# Patient Record
Sex: MALE | Race: WHITE | Employment: FULL TIME | URBAN - METROPOLITAN AREA
[De-identification: names, ages, dates, MRNs, and addresses within clinical notes are randomized per-mention and may not be internally consistent; named-entity substitution may affect disease eponyms.]

---

## 2017-01-13 ENCOUNTER — HOSPITAL ENCOUNTER (EMERGENCY)
Facility: CLINIC | Age: 37
Discharge: HOME | End: 2017-01-13
Attending: EMERGENCY MEDICINE | Admitting: EMERGENCY MEDICINE

## 2017-01-13 ENCOUNTER — APPOINTMENT (OUTPATIENT)
Dept: CT IMAGING | Facility: CLINIC | Age: 37
End: 2017-01-13
Attending: EMERGENCY MEDICINE

## 2017-01-13 VITALS
RESPIRATION RATE: 12 BRPM | DIASTOLIC BLOOD PRESSURE: 103 MMHG | OXYGEN SATURATION: 98 % | SYSTOLIC BLOOD PRESSURE: 147 MMHG | TEMPERATURE: 98.3 F

## 2017-01-13 DIAGNOSIS — R07.89 ATYPICAL CHEST PAIN: ICD-10-CM

## 2017-01-13 DIAGNOSIS — R42 DIZZINESS: ICD-10-CM

## 2017-01-13 LAB
ALBUMIN SERPL-MCNC: 4.3 G/DL (ref 3.4–5)
ALP SERPL-CCNC: 77 U/L (ref 40–150)
ALT SERPL W P-5'-P-CCNC: 76 U/L (ref 0–70)
ANION GAP SERPL CALCULATED.3IONS-SCNC: 8 MMOL/L (ref 3–14)
AST SERPL W P-5'-P-CCNC: 68 U/L (ref 0–45)
BASOPHILS # BLD AUTO: 0.1 10E9/L (ref 0–0.2)
BASOPHILS NFR BLD AUTO: 0.7 %
BILIRUB SERPL-MCNC: 1.1 MG/DL (ref 0.2–1.3)
BUN SERPL-MCNC: 15 MG/DL (ref 7–30)
CALCIUM SERPL-MCNC: 9 MG/DL (ref 8.5–10.1)
CHLORIDE SERPL-SCNC: 104 MMOL/L (ref 94–109)
CO2 SERPL-SCNC: 27 MMOL/L (ref 20–32)
CREAT SERPL-MCNC: 0.77 MG/DL (ref 0.66–1.25)
DIFFERENTIAL METHOD BLD: NORMAL
EOSINOPHIL # BLD AUTO: 0.4 10E9/L (ref 0–0.7)
EOSINOPHIL NFR BLD AUTO: 4.9 %
ERYTHROCYTE [DISTWIDTH] IN BLOOD BY AUTOMATED COUNT: 12.1 % (ref 10–15)
GFR SERPL CREATININE-BSD FRML MDRD: ABNORMAL ML/MIN/1.7M2
GLUCOSE SERPL-MCNC: 85 MG/DL (ref 70–99)
HCT VFR BLD AUTO: 45.3 % (ref 40–53)
HGB BLD-MCNC: 15.4 G/DL (ref 13.3–17.7)
IMM GRANULOCYTES # BLD: 0 10E9/L (ref 0–0.4)
IMM GRANULOCYTES NFR BLD: 0.3 %
LYMPHOCYTES # BLD AUTO: 2.7 10E9/L (ref 0.8–5.3)
LYMPHOCYTES NFR BLD AUTO: 37.6 %
MCH RBC QN AUTO: 31 PG (ref 26.5–33)
MCHC RBC AUTO-ENTMCNC: 34 G/DL (ref 31.5–36.5)
MCV RBC AUTO: 91 FL (ref 78–100)
MONOCYTES # BLD AUTO: 0.5 10E9/L (ref 0–1.3)
MONOCYTES NFR BLD AUTO: 6.9 %
NEUTROPHILS # BLD AUTO: 3.5 10E9/L (ref 1.6–8.3)
NEUTROPHILS NFR BLD AUTO: 49.6 %
NRBC # BLD AUTO: 0 10*3/UL
NRBC BLD AUTO-RTO: 0 /100
PLATELET # BLD AUTO: 250 10E9/L (ref 150–450)
POTASSIUM SERPL-SCNC: 4 MMOL/L (ref 3.4–5.3)
PROT SERPL-MCNC: 8.2 G/DL (ref 6.8–8.8)
RBC # BLD AUTO: 4.96 10E12/L (ref 4.4–5.9)
SODIUM SERPL-SCNC: 139 MMOL/L (ref 133–144)
TROPONIN I SERPL-MCNC: NORMAL UG/L (ref 0–0.04)
WBC # BLD AUTO: 7.1 10E9/L (ref 4–11)

## 2017-01-13 PROCEDURE — 71260 CT THORAX DX C+: CPT

## 2017-01-13 PROCEDURE — 25500064 ZZH RX 255 OP 636: Performed by: EMERGENCY MEDICINE

## 2017-01-13 PROCEDURE — 80053 COMPREHEN METABOLIC PANEL: CPT | Performed by: EMERGENCY MEDICINE

## 2017-01-13 PROCEDURE — 93005 ELECTROCARDIOGRAM TRACING: CPT

## 2017-01-13 PROCEDURE — 25000128 H RX IP 250 OP 636: Performed by: EMERGENCY MEDICINE

## 2017-01-13 PROCEDURE — 99285 EMERGENCY DEPT VISIT HI MDM: CPT | Mod: 25

## 2017-01-13 PROCEDURE — 85025 COMPLETE CBC W/AUTO DIFF WBC: CPT | Performed by: EMERGENCY MEDICINE

## 2017-01-13 PROCEDURE — 84484 ASSAY OF TROPONIN QUANT: CPT | Performed by: EMERGENCY MEDICINE

## 2017-01-13 RX ORDER — IOPAMIDOL 755 MG/ML
500 INJECTION, SOLUTION INTRAVASCULAR ONCE
Status: COMPLETED | OUTPATIENT
Start: 2017-01-13 | End: 2017-01-13

## 2017-01-13 RX ADMIN — IOPAMIDOL 83 ML: 755 INJECTION, SOLUTION INTRAVENOUS at 19:39

## 2017-01-13 RX ADMIN — SODIUM CHLORIDE 90 ML: 9 INJECTION, SOLUTION INTRAVENOUS at 19:39

## 2017-01-13 ASSESSMENT — ENCOUNTER SYMPTOMS
NUMBNESS: 1
WEAKNESS: 1
SHORTNESS OF BREATH: 1
FEVER: 0

## 2017-01-13 NOTE — ED AVS SNAPSHOT
[facility] Emergency Department    [address]    [facility] [address]    Phone:  [phone]    Fax:  [phone]                                       Rigoberto Dowd   MRN: [id number]    Department:  [facility] Emergency Department   Date of Visit:  1/13/2017           Patient Information     Date Of Birth          1980        Your diagnoses for this visit were:     Atypical chest pain     Dizziness        You were seen by Tonya Ramirez MD.      Follow-up Information     Follow up with [facility] Emergency Department.    Specialty:  EMERGENCY MEDICINE    Why:  immediately , If symptoms worsen    Contact information:    [address]  [facility] [id number] [phone]        Follow up with Clinic, [facility] In 3 days.    Why:  to discuss your lung nodules, elevated liver tests, dizziness, and stress test results.    Contact information:    [address] [facility] [address]  [phone]          Discharge Instructions       Discharge Instructions  Chest Pain    You have been seen today for chest pain or discomfort.  At this time, your doctor has found no signs that your chest pain is due to a serious or life-threatening condition, (or you have declined more testing and/or admission to the hospital). However, sometimes there is a serious problem that does not show up right away. Your evaluation today may not be complete and you may need further testing and evaluation.     You need to follow-up with your regular doctor within 3 days.    Return to the Emergency Department if:    Your chest pain changes, gets worse, starts to happen more often, or comes with less activity.    You are short of breath.    You get very weak or tired.    You pass out or faint.    You have any new symptoms, like fever, cough, numb legs, or you cough up blood.    You have anything else that worries you.    Until you follow-up with  your regular doctor please do the following:    Take one aspirin daily unless you have an allergy or are told not to by your doctor.    If a stress test appointment has been made, go to the appointment.    If you have questions, contact your regular doctor.    If your doctor today has told you to follow-up with your regular doctor, it is very important that you make an appointment with your clinic and go to the appointment.  If you do not follow-up with your primary doctor, it may result in missing an important development which could result in permanent injury or disability and/or lasting pain.  If there is any problem keeping your appointment, call your doctor or return to the Emergency Department.    If you were given a prescription for medicine here today, be sure to read all of the information (including the package insert) that comes with your prescription.  This will include important information about the medicine, its side effects, and any warnings that you need to know about.  The pharmacist who fills the prescription can provide more information and answer questions you may have about the medicine.  If you have questions or concerns that the pharmacist cannot address, please call or return to the Emergency Department.         24 Hour Appointment Hotline       To make an appointment at any [facility], call [phone] ([phone]). If you don't have a family doctor or clinic, we will help you find one. [facility] clinics are conveniently located to serve the needs of you and your family.          ED Discharge Orders     Exercise Stress Echocardiogram                    Review of your medicines      STOP taking        Dose Reason for stopping Comments    MIRAPEX PO                      Procedures and tests performed during your visit     CBC with platelets differential    Chest CT w IV contrast only, TRAUMA / DISSECTION    Comprehensive metabolic panel    EKG 12 lead    Peripheral IV: Standard     Pulse oximetry nursing    Troponin I      Orders Needing Specimen Collection     None      Pending Results     Date and Time Order Name Status Description    1/13/2017 1943 EKG 12 lead Preliminary     1/13/2017 1856 Chest CT w IV contrast only, TRAUMA / DISSECTION Preliminary             Pending Culture Results     No orders found from 1/12/2017 to 1/14/2017.       Test Results from your hospital stay           1/13/2017  7:24 PM - Interface, Flexilab Results      Component Results     Component Value Ref Range & Units Status    WBC 7.1 4.0 - 11.0 10e9/L Final    RBC Count 4.96 4.4 - 5.9 10e12/L Final    Hemoglobin 15.4 13.3 - 17.7 g/dL Final    Hematocrit 45.3 40.0 - 53.0 % Final    MCV 91 78 - 100 fl Final    MCH 31.0 26.5 - 33.0 pg Final    MCHC 34.0 31.5 - 36.5 g/dL Final    RDW 12.1 10.0 - 15.0 % Final    Platelet Count 250 150 - 450 10e9/L Final    Diff Method Automated Method  Final    % Neutrophils 49.6 % Final    % Lymphocytes 37.6 % Final    % Monocytes 6.9 % Final    % Eosinophils 4.9 % Final    % Basophils 0.7 % Final    % Immature Granulocytes 0.3 % Final    Nucleated RBCs 0 0 /100 Final    Absolute Neutrophil 3.5 1.6 - 8.3 10e9/L Final    Absolute Lymphocytes 2.7 0.8 - 5.3 10e9/L Final    Absolute Monocytes 0.5 0.0 - 1.3 10e9/L Final    Absolute Eosinophils 0.4 0.0 - 0.7 10e9/L Final    Absolute Basophils 0.1 0.0 - 0.2 10e9/L Final    Abs Immature Granulocytes 0.0 0 - 0.4 10e9/L Final    Absolute Nucleated RBC 0.0  Final         1/13/2017  7:43 PM - Interface, Flexilab Results      Component Results     Component Value Ref Range & Units Status    Sodium 139 133 - 144 mmol/L Final    Potassium 4.0 3.4 - 5.3 mmol/L Final    Chloride 104 94 - 109 mmol/L Final    Carbon Dioxide 27 20 - 32 mmol/L Final    Anion Gap 8 3 - 14 mmol/L Final    Glucose 85 70 - 99 mg/dL Final    Urea Nitrogen 15 7 - 30 mg/dL Final    Creatinine 0.77 0.66 - 1.25 mg/dL Final    GFR Estimate >90  Non  GFR Calc   >60  mL/min/1.7m2 Final    GFR Estimate If Black >90   GFR Calc   >60 mL/min/1.7m2 Final    Calcium 9.0 8.5 - 10.1 mg/dL Final    Bilirubin Total 1.1 0.2 - 1.3 mg/dL Final    Albumin 4.3 3.4 - 5.0 g/dL Final    Protein Total 8.2 6.8 - 8.8 g/dL Final    Alkaline Phosphatase 77 40 - 150 U/L Final    ALT 76 (H) 0 - 70 U/L Final    AST 68 (H) 0 - 45 U/L Final         1/13/2017  7:43 PM - Interface, Flexilab Results      Component Results     Component Value Ref Range & Units Status    Troponin I ES  0.000 - 0.045 ug/L Final    <0.015  The 99th percentile for upper reference range is 0.045 ug/L.  Troponin values in   the range of 0.045 - 0.120 ug/L may be associated with risks of adverse   clinical events.           1/13/2017  8:11 PM - Interface, Radiant Ib      Narrative     CT CHEST WITH CONTRAST   1/13/2017 7:45 PM    HISTORY: Chest pain radiating to the back.    TECHNIQUE: Pulmonary embolism protocol was performed. 83mL Isovue-370  were injected IV. After contrast injection, volumetric helical  acquisition was performed from the thoracic inlet through the upper  abdomen. Radiation dose for this scan was reduced using automated  exposure control, adjustment of the mA and/or kV according to patient  size, or iterative reconstruction technique.    COMPARISON: None.    FINDINGS:  No evidence for pulmonary embolism. The thoracic aorta is  of normal caliber, without evidence for thoracic aortic aneurysm or  dissection. No pleural or pericardial effusions. No enlarged lymph  nodes are identified in the chest. Mild bilateral gynecomastia.  Scattered small indeterminate nodular opacities in both lungs measure  0.4 cm or smaller. The lungs are otherwise clear. There is diffuse  fatty infiltration of the liver. Limited views of the upper abdomen  are otherwise unremarkable.        Impression     IMPRESSION:   1. No evidence for pulmonary embolism or thoracic aortic dissection.  2. Scattered small indeterminate  pulmonary nodules in both lungs  measure 0.4 cm or smaller. 12 month followup CT is recommended if the  patient has known risk factors for lung neoplasm, such as smoking or  history of malignancy. In the absence of risk factors, no followup for  these small nodules is routinely recommended.   3. Diffuse fatty infiltration of the liver.                       Clinical Quality Measure: Blood Pressure Screening     Your blood pressure was checked while you were in the emergency department today. The last reading we obtained was  BP: (!) 152/101 mmHg . Please read the guidelines below about what these numbers mean and what you should do about them.  If your systolic blood pressure (the top number) is less than 120 and your diastolic blood pressure (the bottom number) is less than 80, then your blood pressure is normal. There is nothing more that you need to do about it.  If your systolic blood pressure (the top number) is 120-139 or your diastolic blood pressure (the bottom number) is 80-89, your blood pressure may be higher than it should be. You should have your blood pressure rechecked within a year by a primary care provider.  If your systolic blood pressure (the top number) is 140 or greater or your diastolic blood pressure (the bottom number) is 90 or greater, you may have high blood pressure. High blood pressure is treatable, but if left untreated over time it can put you at risk for heart attack, stroke, or kidney failure. You should have your blood pressure rechecked by a primary care provider within the next 4 weeks.  If your provider in the emergency department today gave you specific instructions to follow-up with your doctor or provider even sooner than that, you should follow that instruction and not wait for up to 4 weeks for your follow-up visit.        Thank you for choosing [facility]       Thank you for choosing [facility] for your care. Our goal is always to provide you with excellent care. Hearing back  "from our patients is one way we can continue to improve our services. Please take a few minutes to complete the written survey that you may receive in the mail after you visit with us. Thank you!        [employer] Mobile Information     [employer] lets you send messages to your doctor, view your test results, renew your prescriptions, schedule appointments and more. To sign up, go to [email or web address] . Click on \"Log in\" on the left side of the screen, which will take you to the Welcome page. Then click on \"Sign up Now\" on the right side of the page.     You will be asked to enter the access code listed below, as well as some personal information. Please follow the directions to create your username and password.     Your access code is: [email or web address]  Expires: 2017  8:34 PM     Your access code will  in 90 days. If you need help or a new code, please call your [facility] clinic or [phone].        Care EveryWhere ID     This is your Care EveryWhere ID. This could be used by other organizations to access your [facility] medical records  [id number]        After Visit Summary       This is your record. Keep this with you and show to your community pharmacist(s) and doctor(s) at your next visit.                  "

## 2017-01-13 NOTE — ED AVS SNAPSHOT
[facility] Emergency Department    [address]    [facility] [address]    Phone:  [phone]    Fax:  [phone]                                       Rigoberto Dwod   MRN: [id number]    Department:  [facility] Emergency Department   Date of Visit:  1/13/2017           After Visit Summary Signature Page     I have received my discharge instructions, and my questions have been answered. I have discussed any challenges I see with this plan with the nurse or doctor.    ..........................................................................................................................................  Patient/Patient Representative Signature      ..........................................................................................................................................  Patient Representative Print Name and Relationship to Patient    ..................................................               ................................................  Date                                            Time    ..........................................................................................................................................  Reviewed by Signature/Title    ...................................................              ..............................................  Date                                                            Time

## 2017-01-14 NOTE — ED PROVIDER NOTES
History   Chief Complaint:  Chest Pain, Dizziness, and shortness of breath      HPI   Rigoberto Dowd is a 36 year old male who presents to the emergency department for evaluation of multiple symptoms. The patient indicates that he has had an ongoing chest discomfort which he describes as an aching pain that he notices throughout the day and the achiness is present throughout the day and night and it occasionally wakes him up at night. He notes that his discomfort radiates to his back but not to his jaw or arms. In addition, he notes that he also feels dizzy and during the dizziness episode he notes that he has blurry vision and his face feels flushed and sometimes feels like he is going to pass out. With the chest discomfort he has noticed numbness in his bilateral upper and lower extremities.  Today what brought him to the emergency room was the dizziness and numbness which he indicates has been ongoing for 10 days but his symptoms have been worsening. He notes that his doctor recently doubled his dose of Mirapex soon before his dizziness started. He denies any exacerbating or alleviating factors but does report that when he goes up stairs he feels more short of breath. He denies any nausea, vomiting, orthostatic changes, ringing in his ears, calf pain, swelling in his legs, or other associated symptoms.     Cardiac Risk Factors:  CAD:                           Negative  Hypertension:              Negative  Hyperlipidemia:           Negative    Diabetes:                     Negative  Tobacco use:              Positive  Gender:                       Male  Age:                             35  Familial Hx of CAD:     Negative     Allergies:  Morphine      Medications:     Mirapex    Tums      Past Medical History:     Hemorrhoids      Past Surgical History:     Colonoscopy     Family History:     History reviewed. No pertinent family history.     Social History:  Tobacco use:                          Current  smoker  Marital status:                              Accompanied to ED by:          Alone      Review of Systems   Constitutional: Negative for fever.   Respiratory: Positive for shortness of breath.    Cardiovascular: Positive for chest pain.   Neurological: Positive for weakness and numbness.   All other systems reviewed and are negative.    Physical Exam   First Vitals:  BP: (!) 146/105 mmHg  Heart Rate: 79  Temp: 98.3  F (36.8  C)  Resp: 16  SpO2: 98 %    Physical Exam  Gen: Pleasant, appears stated age.    Eye:   Pupils are equal, round, and reactive.     Sclera non-injected.    ENT:   Moist mucus membranes.     Normal tongue.    Oropharynx without lesions.    Cardiac:     Normal rate and regular rhythm.    No murmurs, gallops, or rubs.    Pulmonary:     Clear to auscultation bilaterally.    No wheezes, rales, or rhonchi.    Abdomen:     Normal active bowel sounds.     Abdomen is soft and non-distended, without focal tenderness.    Musculoskeletal:     Normal movement of all extremities without evidence for deficit. 2+ radial pulses, calve non tender     Extremities:    No edema.    Skin:   Warm and dry.    Neurologic:    Speech is fluent, cognition is normal.     EOMI, symmetric grimace.     Str 5/5 in RUE, LUE, RLE, LLE.     Fine touch sensation intact in BUE/BLE.      Psychiatric:     Normal affect with appropriate interaction with examiner.    Emergency Department Course   ECG:  Indication: Chest Pain   Time: 1824  Vent. Rate 82 bpm. VT interval 160. QRS duration 102. QT/QTc 372/434. P-R-T axis 46 37 16. Normal sinus rhythm, normal ECG Read time: 1900    Imaging:  Radiographic findings were communicated with the patient who voiced understanding of the findings.  Chest CT w IV contrast   1. No evidence for pulmonary embolism or thoracic aortic dissection.  2. Scattered small indeterminate pulmonary nodules in both lungs  measure 0.4 cm or smaller. 12 month followup CT is recommended if the  patient  has known risk factors for lung neoplasm, such as smoking or  history of malignancy. In the absence of risk factors, no followup for  these small nodules is routinely recommended.   3. Diffuse fatty infiltration of the liver. As per radiology.     Laboratory:  CBC: WBC: 7.1, HGB: 15.4, PLT: 250  CMP: ALT 76(H), AST 68(H), o/w WNL (Creatinine: 0.77)    Troponin <0.015    Emergency Department Course:  Nursing notes and vitals reviewed. I performed an exam of the patient as documented above.     Blood drawn. This was sent to the lab for further testing, results above.    The patient was sent for a CT while in the emergency department, findings above.     2030 I reevaluated the patient and provided an update in regards to his ED course.      I personally reviewed the laboratory and imaging results with the Patient and answered all related questions prior to discharge.     Findings and plan explained to the Patient. Patient discharged home with instructions regarding supportive care, medications, and reasons to return. The importance of close follow-up was reviewed.     Impression & Plan    Medical Decision Making:  Rigoberto Dowd is a 36 year old male with history of smoking who presents with a combination of substernal chest discomfort and dizziness. On exam, the patient is slightly hypertensive although this resolved without intervention. Otherwise, he is well appearing with a normal neurological exam. In regards to his chest discomfort, his history is very atypical for acute coronary syndrome. In addition, ECG is negative for ischemic findings and initial troponin is negative. I feel this adequately ruled ACS given that his symptoms have been constant for several days at least. Given the neurologic symptoms and pain radiating to the back CT of the chest was obtained. This is negative for any evidence of pulmonary embolism or aortic dissection. Otherwise, the patient's symptoms are not consistent with other processes  such as pericarditis or myocarditis. In regards to the dizziness, he does not have any findings on history or exam to suggest acute CVA. He recently had an increase in his dose of Mirapex which is often associated with dizziness.  This seems to correlate with the onset of his symptoms. I have recommended he discontinue this over the weekend and follow up with his primary care provider. I have also discussed the findings of the fatty infiltration of the liver, pulmonary nodules, as well as mildly elevated LFT's. All of these issues I have instructed to follow up with his PCP. Otherwise, he will return to the ED if he develops any worsening of his symptoms.     Diagnosis:    ICD-10-CM    1. Atypical chest pain R07.89 Exercise Stress Echocardiogram   2. Dizziness R42      Moises Said  1/13/2017   [facility] EMERGENCY DEPARTMENT    Moises AWAN Said, am serving as a scribe on 1/13/2017 at 6:45 PM to personally document services performed by Tonya Ramirez MD based on my observations and the provider's statements to me.       Tonya Ramirez MD  01/14/17 0138

## 2017-01-14 NOTE — ED NOTES
Pt arrives with c/o mid-sternal chest achy-ness, SOB and dizziness for past 10 days. States the dizziness has gotten worse, so he decided to be seen. Went to [facility], EKG showed SR. VSS. Given 324 mg asa PTA. Smokes less than pack/day, no recent travel or calf pain. ABC intact. A&O x4.

## 2017-01-14 NOTE — DISCHARGE INSTRUCTIONS
Discharge Instructions  Chest Pain    You have been seen today for chest pain or discomfort.  At this time, your doctor has found no signs that your chest pain is due to a serious or life-threatening condition, (or you have declined more testing and/or admission to the hospital). However, sometimes there is a serious problem that does not show up right away. Your evaluation today may not be complete and you may need further testing and evaluation.     You need to follow-up with your regular doctor within 3 days.    Return to the Emergency Department if:    Your chest pain changes, gets worse, starts to happen more often, or comes with less activity.    You are short of breath.    You get very weak or tired.    You pass out or faint.    You have any new symptoms, like fever, cough, numb legs, or you cough up blood.    You have anything else that worries you.    Until you follow-up with your regular doctor please do the following:    Take one aspirin daily unless you have an allergy or are told not to by your doctor.    If a stress test appointment has been made, go to the appointment.    If you have questions, contact your regular doctor.    If your doctor today has told you to follow-up with your regular doctor, it is very important that you make an appointment with your clinic and go to the appointment.  If you do not follow-up with your primary doctor, it may result in missing an important development which could result in permanent injury or disability and/or lasting pain.  If there is any problem keeping your appointment, call your doctor or return to the Emergency Department.    If you were given a prescription for medicine here today, be sure to read all of the information (including the package insert) that comes with your prescription.  This will include important information about the medicine, its side effects, and any warnings that you need to know about.  The pharmacist who fills the prescription can  provide more information and answer questions you may have about the medicine.  If you have questions or concerns that the pharmacist cannot address, please call or return to the Emergency Department.

## 2017-01-15 LAB — INTERPRETATION ECG - MUSE: NORMAL

## 2018-03-11 ENCOUNTER — RADIANT APPOINTMENT (OUTPATIENT)
Dept: GENERAL RADIOLOGY | Facility: CLINIC | Age: 38
End: 2018-03-11
Attending: FAMILY MEDICINE
Payer: COMMERCIAL

## 2018-03-11 ENCOUNTER — OFFICE VISIT (OUTPATIENT)
Dept: URGENT CARE | Facility: URGENT CARE | Age: 38
End: 2018-03-11
Payer: COMMERCIAL

## 2018-03-11 VITALS
TEMPERATURE: 97.8 F | DIASTOLIC BLOOD PRESSURE: 82 MMHG | SYSTOLIC BLOOD PRESSURE: 139 MMHG | OXYGEN SATURATION: 97 % | HEART RATE: 100 BPM

## 2018-03-11 DIAGNOSIS — H60.312 ACUTE DIFFUSE OTITIS EXTERNA OF LEFT EAR: ICD-10-CM

## 2018-03-11 DIAGNOSIS — H92.02 LEFT EAR PAIN: Primary | ICD-10-CM

## 2018-03-11 DIAGNOSIS — H92.02 LEFT EAR PAIN: ICD-10-CM

## 2018-03-11 PROCEDURE — 70250 X-RAY EXAM OF SKULL: CPT

## 2018-03-11 PROCEDURE — 99214 OFFICE O/P EST MOD 30 MIN: CPT | Performed by: FAMILY MEDICINE

## 2018-03-11 RX ORDER — CEFDINIR 300 MG/1
300 CAPSULE ORAL 2 TIMES DAILY
Qty: 20 CAPSULE | Refills: 0 | Status: SHIPPED | OUTPATIENT
Start: 2018-03-11 | End: 2018-05-23

## 2018-03-11 RX ORDER — OFLOXACIN 3 MG/ML
10 SOLUTION AURICULAR (OTIC) 2 TIMES DAILY
Qty: 10 ML | Refills: 0 | Status: SHIPPED | OUTPATIENT
Start: 2018-03-11 | End: 2018-03-18

## 2018-03-11 ASSESSMENT — ENCOUNTER SYMPTOMS
MUSCULOSKELETAL NEGATIVE: 1
PSYCHIATRIC NEGATIVE: 1
CONSTITUTIONAL NEGATIVE: 1
GASTROINTESTINAL NEGATIVE: 1
ENDOCRINE NEGATIVE: 1
EYES NEGATIVE: 1
RESPIRATORY NEGATIVE: 1
CARDIOVASCULAR NEGATIVE: 1

## 2018-03-11 NOTE — MR AVS SNAPSHOT
After Visit Summary   3/11/2018    Rigoberto Dowd    MRN: [id number]           Patient Information     Date Of Birth          1980        Visit Information        Provider Department      3/11/2018 8:50 AM Fritz Harper MD [facility]        Today's Diagnoses     Left ear pain    -  1    Acute diffuse otitis externa of left ear           Follow-ups after your visit        Who to contact     If you have questions or need follow up information about today's clinic visit or your schedule please contact [facility] directly at [phone].  Normal or non-critical lab and imaging results will be communicated to you by [employer] Haptics[employer], letter or phone within 4 business days after the clinic has received the results. If you do not hear from us within 7 days, please contact the clinic through Printit or phone. If you have a critical or abnormal lab result, we will notify you by phone as soon as possible.  Submit refill requests through [employer] or call your pharmacy and they will forward the refill request to us. Please allow 3 business days for your refill to be completed.          Additional Information About Your Visit        MyChart Information     [employer] gives you secure access to your electronic health record. If you see a primary care provider, you can also send messages to your care team and make appointments. If you have questions, please call your primary care clinic.  If you do not have a primary care provider, please call [phone] and they will assist you.        Care EveryWhere ID     This is your Care EveryWhere ID. This could be used by other organizations to access your [facility] medical records  [id number]        Your Vitals Were     Pulse Temperature Pulse Oximetry             100 97.8  F (36.6  C) (Oral) 97%          Blood Pressure from Last 3 Encounters:   03/11/18 139/82   01/13/17 (!) 147/103   07/10/16 142/81    Weight from Last 3 Encounters:    07/09/16 275 lb (124.7 kg)   06/01/16 275 lb (124.7 kg)   04/08/16 270 lb (122.5 kg)              Today, you had the following     No orders found for display         Today's Medication Changes          These changes are accurate as of 3/11/18  9:45 AM.  If you have any questions, ask your nurse or doctor.               Start taking these medicines.        Dose/Directions    cefdinir 300 MG capsule   Commonly known as:  OMNICEF   Used for:  Left ear pain   Started by:  Fritz Harper MD        Dose:  300 mg   Take 1 capsule (300 mg) by mouth 2 times daily   Quantity:  20 capsule   Refills:  0       ofloxacin 0.3 % otic solution   Commonly known as:  FLOXIN   Used for:  Left ear pain   Started by:  Fritz Harper MD        Dose:  10 drop   Place 10 drops Into the left ear 2 times daily for 7 days   Quantity:  10 mL   Refills:  0            Where to get your medicines      These medications were sent to [facility] [address] [place] [address] AT [address]  [address] [place] [address]     Phone:  [phone]     cefdinir 300 MG capsule    ofloxacin 0.3 % otic solution                Primary Care Provider Office Phone # Fax #    [facility] [phone] [address] [facility] [address]        Equal Access to Services     JACOB CHARLES AH: Hadii jones ku hadasho Soomaali, waaxda luqadaha, qaybta kaalmada adeegyada, varun greenin haykinsey sharp . So [facility] [phone].    ATENCIÓN: Si habla español, tiene a christopher disposición servicios gratuitos de asistencia lingüística. Llame al [phone].    We comply with applicable federal civil rights laws and [place] laws. We do not discriminate on the basis of race, color, national origin, age, disability, sex, sexual orientation, or gender identity.            Thank you!     Thank you for choosing [facility]  for your care. Our goal is always to  provide you with excellent care. Hearing back from our patients is one way we can continue to improve our services. Please take a few minutes to complete the written survey that you may receive in the mail after your visit with us. Thank you!             Your Updated Medication List - Protect others around you: Learn how to safely use, store and throw away your medicines at www.disposemymeds.org.          This list is accurate as of 3/11/18  9:45 AM.  Always use your most recent med list.                   Brand Name Dispense Instructions for use Diagnosis    adalimumab 40 MG/0.8ML prefilled syringe kit    HUMIRA     Inject 40 mg Subcutaneous every 14 days        cefdinir 300 MG capsule    OMNICEF    20 capsule    Take 1 capsule (300 mg) by mouth 2 times daily    Left ear pain       MIRAPEX PO           ofloxacin 0.3 % otic solution    FLOXIN    10 mL    Place 10 drops Into the left ear 2 times daily for 7 days    Left ear pain

## 2018-03-11 NOTE — PROGRESS NOTES
SUBJECTIVE:   Rigoberto Dowd is a 37 year old male presenting with a chief complaint of   Chief Complaint   Patient presents with     Urgent Care     Ear Problem     L ear swollen started yesterday- painful L lateral side of neck, discomfort on ear, hard to hear.       He is a new patient of [facility].    Onset of symptoms was 2 day(s) ago.  Course of illness is same.    Severity mild  Current and Associated symptoms: ear pain left and hearing down on Lt  Treatment measures tried include Tylenol/Ibuprofen.  Predisposing factors include None.        Review of Systems   Constitutional: Negative.    HENT: Positive for ear pain and hearing loss.    Eyes: Negative.    Respiratory: Negative.    Cardiovascular: Negative.    Gastrointestinal: Negative.    Endocrine: Negative.    Genitourinary: Negative.    Musculoskeletal: Negative.    Skin:        Lt ear there is sweeling and redness on the outer ear   Hematological:        He is on immune suppression medication   Psychiatric/Behavioral: Negative.        Past Medical History:   Diagnosis Date     H/O colonoscopy      H/O hemorrhoids      Sleep apnea      No family history on file.  Current Outpatient Prescriptions   Medication Sig Dispense Refill     Pramipexole Dihydrochloride (MIRAPEX PO)        adalimumab (HUMIRA) 40 MG/0.8ML prefilled syringe kit Inject 40 mg Subcutaneous every 14 days       Social History   Substance Use Topics     Smoking status: Light Tobacco Smoker     Smokeless tobacco: Not on file     Alcohol use Not on file       OBJECTIVE  There were no vitals taken for this visit.    Physical Exam   Constitutional: He is oriented to person, place, and time. He appears well-developed and well-nourished.   HENT:   Nose: Nose normal.   Mouth/Throat: Oropharynx is clear and moist.   Lt outer ear is red and there is some pain to palpation in the Lt mastoid area.     Neck adenopathy   Eyes: EOM are normal.   Neck: Neck supple.   Cardiovascular: Normal rate and  regular rhythm.    Pulmonary/Chest: Effort normal.   Abdominal: Soft.   Musculoskeletal: Normal range of motion.   Lymphadenopathy:     He has cervical adenopathy.   Neurological: He is alert and oriented to person, place, and time.   Skin: Skin is warm.   Lt ear erythema and pain.       Labs:  No results found for this or any previous visit (from the past 24 hour(s)).    X-Ray was done, my findings are: no obvious problems with mastoid    ASSESSMENT:    1. Ear pain  2. Otitis externa  3. cellulitis    Medical Decision Making:    Differential Diagnosis:  URI Adult/Peds:  Acute left otitis media, Otitis externa, Sinusitis and Viral upper respiratory illness  Bug bite  inflammation    Serious Comorbid Conditions:  Adult:  immuno suppression    PLAN:    URI Adult:  Tylenol, Ibuprofen and omnicef and floxin . He will  cortisone and use 2 times per day.    Followup:    If not improving or if condition worsens, follow up with your Primary Care Provider    There are no Patient Instructions on file for this visit.

## 2018-05-23 ENCOUNTER — OFFICE VISIT (OUTPATIENT)
Dept: FAMILY MEDICINE | Facility: CLINIC | Age: 38
End: 2018-05-23
Payer: COMMERCIAL

## 2018-05-23 VITALS
DIASTOLIC BLOOD PRESSURE: 80 MMHG | BODY MASS INDEX: 38.65 KG/M2 | WEIGHT: 285 LBS | TEMPERATURE: 98.7 F | HEART RATE: 101 BPM | SYSTOLIC BLOOD PRESSURE: 146 MMHG | OXYGEN SATURATION: 97 %

## 2018-05-23 DIAGNOSIS — H57.89 SWELLING OF EYE, LEFT: ICD-10-CM

## 2018-05-23 DIAGNOSIS — L03.211 CELLULITIS, FACE: Primary | ICD-10-CM

## 2018-05-23 PROCEDURE — 99214 OFFICE O/P EST MOD 30 MIN: CPT | Performed by: PHYSICIAN ASSISTANT

## 2018-05-23 RX ORDER — CEPHALEXIN 500 MG/1
500 CAPSULE ORAL 3 TIMES DAILY
Qty: 21 CAPSULE | Refills: 0 | Status: ON HOLD | OUTPATIENT
Start: 2018-05-23 | End: 2018-05-27

## 2018-05-23 RX ORDER — OFLOXACIN 3 MG/ML
1 SOLUTION/ DROPS OPHTHALMIC 4 TIMES DAILY
Qty: 5 ML | Refills: 0 | Status: SHIPPED | OUTPATIENT
Start: 2018-05-23 | End: 2018-05-30

## 2018-05-23 ASSESSMENT — ENCOUNTER SYMPTOMS
EYE DISCHARGE: 0
EYE REDNESS: 0

## 2018-05-23 NOTE — PROGRESS NOTES
SUBJECTIVE:   Rigoberto Dowd is a 37 year old male presenting with a chief complaint of   Chief Complaint   Patient presents with     Urgent Care     Eye Problem     woke up today and left eye is swollen, took benadryl and it is getting worse        He is an established patient of [facility].    Patient presenting to clinic today with a complaint of left sided facial swelling and tenderness since this morning.  The swelling is localized to the medial canthus of the left eye and below the left eye. He took Benadryl without any significant improvement.  He denies any injury or trauma.  He does not recall any insect bites or stings.  He is on Stelara for psoriatic arthritis.  He denies any fevers or chills.  He notes mild blurry vision from the left eye. Denies any headaches. Symptoms are worsening.      Review of Systems   HENT:        Swelling left cheek   Eyes: Negative for discharge, redness and visual disturbance.        Swelling medial canthus of left eye       Past Medical History:   Diagnosis Date     H/O colonoscopy      H/O hemorrhoids      Sleep apnea      History reviewed. No pertinent family history.  Current Outpatient Prescriptions   Medication Sig Dispense Refill     adalimumab (HUMIRA) 40 MG/0.8ML prefilled syringe kit Inject 40 mg Subcutaneous every 14 days       cephALEXin (KEFLEX) 500 MG capsule Take 1 capsule (500 mg) by mouth 3 times daily for 7 days 21 capsule 0     ofloxacin (OCUFLOX) 0.3 % ophthalmic solution Place 1 drop Into the left eye 4 times daily for 7 days 5 mL 0     Pramipexole Dihydrochloride (MIRAPEX PO)        Social History   Substance Use Topics     Smoking status: Former Smoker     Smokeless tobacco: Never Used     Alcohol use Not on file       OBJECTIVE  /80  Pulse 101  Temp 98.7  F (37.1  C) (Oral)  Wt 285 lb (129.3 kg)  SpO2 97%  BMI 38.65 kg/m2    Physical Exam   Constitutional: He appears well-developed and well-nourished. No distress.   HENT:   Head: Normocephalic  and atraumatic.   Right Ear: Tympanic membrane and external ear normal.   Left Ear: Tympanic membrane and external ear normal.   Mouth/Throat: Oropharynx is clear and moist.   Mild to moderate swelling and erythema noted in the medial canthus of the left eye extending below the left eye into the left cheek area.  He is tender to palpation.   Eyes: EOM are normal. Pupils are equal, round, and reactive to light. Right eye exhibits no discharge. Left eye exhibits no discharge.   Neck: Normal range of motion. Neck supple.   Cardiovascular: Normal rate, regular rhythm and normal heart sounds.    Pulmonary/Chest: Effort normal and breath sounds normal. No respiratory distress.   Lymphadenopathy:     He has no cervical adenopathy.   Neurological: He is alert.   Skin: Skin is warm and dry. He is not diaphoretic.   Psychiatric: He has a normal mood and affect.   Nursing note and vitals reviewed.      Labs:  No results found for this or any previous visit (from the past 24 hour(s)).        ASSESSMENT:      ICD-10-CM    1. Cellulitis, face L03.211 cephALEXin (KEFLEX) 500 MG capsule   2. Swelling of eye, left H57.8 ofloxacin (OCUFLOX) 0.3 % ophthalmic solution        Medical Decision Making:    Differential Diagnosis:  Dermatitis, cellulitis    Serious Comorbid Conditions:  Adult:  immunosuppressed    PLAN:  Cellulitis face: Erythematous, tender and swollen area left side of face worsening since this morning in an immunosuppressed patient.  Keflex is prescribed.  Ofloxacin eyedrops prescribed.  Keep monitoring symptoms. Follow up if symptoms do not improve as anticipated.  Follow-up sooner if worsening symptoms. Patient understands and agrees with the plan.      Followup:    If not improving or if condition worsens, follow up with your Primary Care Provider

## 2018-05-23 NOTE — MR AVS SNAPSHOT
After Visit Summary   5/23/2018    Rigoberto Dowd    MRN: [id number]           Patient Information     Date Of Birth          1980        Visit Information        Provider Department      5/23/2018 4:15 PM Mellisa Ferrari PA-C [facility]        Today's Diagnoses     Cellulitis, face    -  1    Swelling of eye, left           Follow-ups after your visit        Who to contact     If you have questions or need follow up information about today's clinic visit or your schedule please contact [facility] directly at [phone].  Normal or non-critical lab and imaging results will be communicated to you by [employer], letter or phone within 4 business days after the clinic has received the results. If you do not hear from us within 7 days, please contact the clinic through [employer]al[employer] or phone. If you have a critical or abnormal lab result, we will notify you by phone as soon as possible.  Submit refill requests through [employer] or call your pharmacy and they will forward the refill request to us. Please allow 3 business days for your refill to be completed.          Additional Information About Your Visit        MyChart Information     [employer] gives you secure access to your electronic health record. If you see a primary care provider, you can also send messages to your care team and make appointments. If you have questions, please call your primary care clinic.  If you do not have a primary care provider, please call [phone] and they will assist you.        Care EveryWhere ID     This is your Care EveryWhere ID. This could be used by other organizations to access your [facility] medical records  [id number]        Your Vitals Were     Pulse Temperature Pulse Oximetry BMI (Body Mass Index)          101 98.7  F (37.1  C) (Oral) 97% 38.65 kg/m2         Blood Pressure from Last 3 Encounters:   05/23/18 146/80   03/11/18 139/82   01/13/17 (!) 147/103    Weight from Last  3 Encounters:   05/23/18 285 lb (129.3 kg)   07/09/16 275 lb (124.7 kg)   06/01/16 275 lb (124.7 kg)              Today, you had the following     No orders found for display         Today's Medication Changes          These changes are accurate as of 5/23/18  8:53 PM.  If you have any questions, ask your nurse or doctor.               Start taking these medicines.        Dose/Directions    cephALEXin 500 MG capsule   Commonly known as:  KEFLEX   Used for:  Cellulitis, face   Started by:  Mellisa Ferrari PA-C        Dose:  500 mg   Take 1 capsule (500 mg) by mouth 3 times daily for 7 days   Quantity:  21 capsule   Refills:  0       ofloxacin 0.3 % ophthalmic solution   Commonly known as:  OCUFLOX   Used for:  Swelling of eye, left   Started by:  Mellisa Ferrari PA-C        Dose:  1 drop   Place 1 drop Into the left eye 4 times daily for 7 days   Quantity:  5 mL   Refills:  0            Where to get your medicines      These medications were sent to [facility] [address] [place] [address] AT [address]  [address] [facility] [address]     Phone:  [phone]     cephALEXin 500 MG capsule    ofloxacin 0.3 % ophthalmic solution                Primary Care Provider Office Phone # Fax #    [facility] [phone] [address] [facility] [address]        Equal Access to Services     JACOB CHARLES AH: Hadii jones ku hadasho Soomaali, waaxda luqadaha, qaybta kaalmada adeegyada, varun mari. So [facility] [phone].    ATENCIÓN: Si habla español, tiene a christopher disposición servicios gratuitos de asistencia lingüística. Heavenly al [phone].    We comply with applicable federal civil rights laws and [place] laws. We do not discriminate on the basis of race, color, national origin, age, disability, sex, sexual orientation, or gender identity.            Thank you!     Thank you for choosing [facility]  for your care. Our goal is always to provide you with excellent care. Hearing back from our patients is one way we can continue to improve our services. Please take a few minutes to complete the written survey that you may receive in the mail after your visit with us. Thank you!             Your Updated Medication List - Protect others around you: Learn how to safely use, store and throw away your medicines at www.disposemymeds.org.          This list is accurate as of 5/23/18  8:53 PM.  Always use your most recent med list.                   Brand Name Dispense Instructions for use Diagnosis    adalimumab 40 MG/0.8ML prefilled syringe kit    HUMIRA     Inject 40 mg Subcutaneous every 14 days        cephALEXin 500 MG capsule    KEFLEX    21 capsule    Take 1 capsule (500 mg) by mouth 3 times daily for 7 days    Cellulitis, face       MIRAPEX PO           ofloxacin 0.3 % ophthalmic solution    OCUFLOX    5 mL    Place 1 drop Into the left eye 4 times daily for 7 days    Swelling of eye, left

## 2018-05-24 ENCOUNTER — HOSPITAL ENCOUNTER (INPATIENT)
Facility: CLINIC | Age: 38
LOS: 3 days | Discharge: HOME | DRG: 603 | End: 2018-05-27
Attending: EMERGENCY MEDICINE | Admitting: INTERNAL MEDICINE
Payer: COMMERCIAL

## 2018-05-24 DIAGNOSIS — L03.211 FACIAL CELLULITIS: ICD-10-CM

## 2018-05-24 LAB
ANION GAP SERPL CALCULATED.3IONS-SCNC: 5 MMOL/L (ref 3–14)
BASOPHILS # BLD AUTO: 0 10E9/L (ref 0–0.2)
BASOPHILS NFR BLD AUTO: 0.4 %
BUN SERPL-MCNC: 10 MG/DL (ref 7–30)
CALCIUM SERPL-MCNC: 9 MG/DL (ref 8.5–10.1)
CHLORIDE SERPL-SCNC: 106 MMOL/L (ref 94–109)
CO2 SERPL-SCNC: 27 MMOL/L (ref 20–32)
CREAT SERPL-MCNC: 0.83 MG/DL (ref 0.66–1.25)
DIFFERENTIAL METHOD BLD: NORMAL
EOSINOPHIL # BLD AUTO: 0.2 10E9/L (ref 0–0.7)
EOSINOPHIL NFR BLD AUTO: 1.9 %
ERYTHROCYTE [DISTWIDTH] IN BLOOD BY AUTOMATED COUNT: 12.5 % (ref 10–15)
GFR SERPL CREATININE-BSD FRML MDRD: >90 ML/MIN/1.7M2
GLUCOSE SERPL-MCNC: 110 MG/DL (ref 70–99)
HCT VFR BLD AUTO: 42 % (ref 40–53)
HGB BLD-MCNC: 14.5 G/DL (ref 13.3–17.7)
IMM GRANULOCYTES # BLD: 0 10E9/L (ref 0–0.4)
IMM GRANULOCYTES NFR BLD: 0.3 %
LYMPHOCYTES # BLD AUTO: 1.9 10E9/L (ref 0.8–5.3)
LYMPHOCYTES NFR BLD AUTO: 19.8 %
MCH RBC QN AUTO: 31.1 PG (ref 26.5–33)
MCHC RBC AUTO-ENTMCNC: 34.5 G/DL (ref 31.5–36.5)
MCV RBC AUTO: 90 FL (ref 78–100)
MONOCYTES # BLD AUTO: 0.8 10E9/L (ref 0–1.3)
MONOCYTES NFR BLD AUTO: 7.9 %
NEUTROPHILS # BLD AUTO: 6.8 10E9/L (ref 1.6–8.3)
NEUTROPHILS NFR BLD AUTO: 69.7 %
NRBC # BLD AUTO: 0 10*3/UL
NRBC BLD AUTO-RTO: 0 /100
PLATELET # BLD AUTO: 275 10E9/L (ref 150–450)
POTASSIUM SERPL-SCNC: 4 MMOL/L (ref 3.4–5.3)
RBC # BLD AUTO: 4.66 10E12/L (ref 4.4–5.9)
SODIUM SERPL-SCNC: 138 MMOL/L (ref 133–144)
WBC # BLD AUTO: 9.8 10E9/L (ref 4–11)

## 2018-05-24 PROCEDURE — 96375 TX/PRO/DX INJ NEW DRUG ADDON: CPT

## 2018-05-24 PROCEDURE — 25000132 ZZH RX MED GY IP 250 OP 250 PS 637: Performed by: PHYSICIAN ASSISTANT

## 2018-05-24 PROCEDURE — 96365 THER/PROPH/DIAG IV INF INIT: CPT

## 2018-05-24 PROCEDURE — 99285 EMERGENCY DEPT VISIT HI MDM: CPT | Mod: 25

## 2018-05-24 PROCEDURE — 80048 BASIC METABOLIC PNL TOTAL CA: CPT | Performed by: EMERGENCY MEDICINE

## 2018-05-24 PROCEDURE — 25000128 H RX IP 250 OP 636: Performed by: PHYSICIAN ASSISTANT

## 2018-05-24 PROCEDURE — 25000128 H RX IP 250 OP 636: Performed by: EMERGENCY MEDICINE

## 2018-05-24 PROCEDURE — 12000013 ZZH R&B PEDS

## 2018-05-24 PROCEDURE — 99223 1ST HOSP IP/OBS HIGH 75: CPT | Mod: AI | Performed by: INTERNAL MEDICINE

## 2018-05-24 PROCEDURE — 85025 COMPLETE CBC W/AUTO DIFF WBC: CPT | Performed by: EMERGENCY MEDICINE

## 2018-05-24 RX ORDER — KETOROLAC TROMETHAMINE 30 MG/ML
30 INJECTION, SOLUTION INTRAMUSCULAR; INTRAVENOUS EVERY 6 HOURS PRN
Status: DISCONTINUED | OUTPATIENT
Start: 2018-05-24 | End: 2018-05-26

## 2018-05-24 RX ORDER — PRAMIPEXOLE DIHYDROCHLORIDE 0.25 MG/1
1 TABLET ORAL AT BEDTIME
Status: DISCONTINUED | OUTPATIENT
Start: 2018-05-24 | End: 2018-05-27 | Stop reason: HOSPADM

## 2018-05-24 RX ORDER — HYDROMORPHONE HCL/0.9% NACL/PF 0.2MG/0.2
0.2 SYRINGE (ML) INTRAVENOUS
Status: DISCONTINUED | OUTPATIENT
Start: 2018-05-24 | End: 2018-05-24

## 2018-05-24 RX ORDER — ACETAMINOPHEN 500 MG
500-1000 TABLET ORAL EVERY 4 HOURS PRN
Status: DISCONTINUED | OUTPATIENT
Start: 2018-05-24 | End: 2018-05-27 | Stop reason: HOSPADM

## 2018-05-24 RX ORDER — HYDROCORTISONE 25 MG/G
OINTMENT TOPICAL 3 TIMES DAILY PRN
COMMUNITY

## 2018-05-24 RX ORDER — AMPICILLIN AND SULBACTAM 2; 1 G/1; G/1
3 INJECTION, POWDER, FOR SOLUTION INTRAMUSCULAR; INTRAVENOUS ONCE
Status: COMPLETED | OUTPATIENT
Start: 2018-05-24 | End: 2018-05-24

## 2018-05-24 RX ORDER — SODIUM CHLORIDE 9 MG/ML
INJECTION, SOLUTION INTRAVENOUS CONTINUOUS
Status: DISCONTINUED | OUTPATIENT
Start: 2018-05-24 | End: 2018-05-25

## 2018-05-24 RX ORDER — AMOXICILLIN 250 MG
1 CAPSULE ORAL 2 TIMES DAILY PRN
Status: DISCONTINUED | OUTPATIENT
Start: 2018-05-24 | End: 2018-05-27 | Stop reason: HOSPADM

## 2018-05-24 RX ORDER — CEFAZOLIN SODIUM 1 G/3ML
1 INJECTION, POWDER, FOR SOLUTION INTRAMUSCULAR; INTRAVENOUS EVERY 8 HOURS
Status: DISCONTINUED | OUTPATIENT
Start: 2018-05-24 | End: 2018-05-24

## 2018-05-24 RX ORDER — PROCHLORPERAZINE MALEATE 5 MG
10 TABLET ORAL EVERY 6 HOURS PRN
Status: DISCONTINUED | OUTPATIENT
Start: 2018-05-24 | End: 2018-05-27 | Stop reason: HOSPADM

## 2018-05-24 RX ORDER — AMOXICILLIN 250 MG
2 CAPSULE ORAL 2 TIMES DAILY PRN
Status: DISCONTINUED | OUTPATIENT
Start: 2018-05-24 | End: 2018-05-27 | Stop reason: HOSPADM

## 2018-05-24 RX ORDER — PROCHLORPERAZINE 25 MG
25 SUPPOSITORY, RECTAL RECTAL EVERY 12 HOURS PRN
Status: DISCONTINUED | OUTPATIENT
Start: 2018-05-24 | End: 2018-05-27 | Stop reason: HOSPADM

## 2018-05-24 RX ORDER — IBUPROFEN 600 MG/1
600 TABLET, FILM COATED ORAL EVERY 6 HOURS PRN
Status: DISCONTINUED | OUTPATIENT
Start: 2018-05-24 | End: 2018-05-24 | Stop reason: ALTCHOICE

## 2018-05-24 RX ORDER — NALOXONE HYDROCHLORIDE 0.4 MG/ML
.1-.4 INJECTION, SOLUTION INTRAMUSCULAR; INTRAVENOUS; SUBCUTANEOUS
Status: DISCONTINUED | OUTPATIENT
Start: 2018-05-24 | End: 2018-05-27 | Stop reason: HOSPADM

## 2018-05-24 RX ORDER — AMLODIPINE BESYLATE 5 MG/1
5 TABLET ORAL EVERY EVENING
Status: DISCONTINUED | OUTPATIENT
Start: 2018-05-24 | End: 2018-05-27 | Stop reason: HOSPADM

## 2018-05-24 RX ORDER — POLYETHYLENE GLYCOL 3350 17 G/17G
17 POWDER, FOR SOLUTION ORAL DAILY PRN
Status: DISCONTINUED | OUTPATIENT
Start: 2018-05-24 | End: 2018-05-27 | Stop reason: HOSPADM

## 2018-05-24 RX ORDER — LABETALOL HYDROCHLORIDE 5 MG/ML
10 INJECTION, SOLUTION INTRAVENOUS EVERY 4 HOURS PRN
Status: DISCONTINUED | OUTPATIENT
Start: 2018-05-24 | End: 2018-05-27 | Stop reason: HOSPADM

## 2018-05-24 RX ORDER — ONDANSETRON 4 MG/1
4 TABLET, ORALLY DISINTEGRATING ORAL EVERY 6 HOURS PRN
Status: DISCONTINUED | OUTPATIENT
Start: 2018-05-24 | End: 2018-05-27 | Stop reason: HOSPADM

## 2018-05-24 RX ORDER — ONDANSETRON 2 MG/ML
4 INJECTION INTRAMUSCULAR; INTRAVENOUS EVERY 6 HOURS PRN
Status: DISCONTINUED | OUTPATIENT
Start: 2018-05-24 | End: 2018-05-27 | Stop reason: HOSPADM

## 2018-05-24 RX ORDER — OXYCODONE HYDROCHLORIDE 5 MG/1
5-10 TABLET ORAL
Status: DISCONTINUED | OUTPATIENT
Start: 2018-05-24 | End: 2018-05-24

## 2018-05-24 RX ADMIN — AMLODIPINE BESYLATE 5 MG: 5 TABLET ORAL at 21:19

## 2018-05-24 RX ADMIN — TAZOBACTAM SODIUM AND PIPERACILLIN SODIUM 3.38 G: 375; 3 INJECTION, SOLUTION INTRAVENOUS at 13:01

## 2018-05-24 RX ADMIN — VANCOMYCIN HYDROCHLORIDE 2500 MG: 5 INJECTION, POWDER, LYOPHILIZED, FOR SOLUTION INTRAVENOUS at 08:54

## 2018-05-24 RX ADMIN — ACETAMINOPHEN 1000 MG: 500 TABLET, FILM COATED ORAL at 10:59

## 2018-05-24 RX ADMIN — TAZOBACTAM SODIUM AND PIPERACILLIN SODIUM 3.38 G: 375; 3 INJECTION, SOLUTION INTRAVENOUS at 18:51

## 2018-05-24 RX ADMIN — PRAMIPEXOLE DIHYDROCHLORIDE 1 MG: 0.25 TABLET ORAL at 21:32

## 2018-05-24 RX ADMIN — SODIUM CHLORIDE: 9 INJECTION, SOLUTION INTRAVENOUS at 11:01

## 2018-05-24 RX ADMIN — AMPICILLIN SODIUM AND SULBACTAM SODIUM 3 G: 2; 1 INJECTION, POWDER, FOR SOLUTION INTRAMUSCULAR; INTRAVENOUS at 07:51

## 2018-05-24 RX ADMIN — KETOROLAC TROMETHAMINE 30 MG: 30 INJECTION, SOLUTION INTRAMUSCULAR at 21:17

## 2018-05-24 RX ADMIN — SODIUM CHLORIDE: 9 INJECTION, SOLUTION INTRAVENOUS at 21:18

## 2018-05-24 ASSESSMENT — ENCOUNTER SYMPTOMS
TROUBLE SWALLOWING: 0
EYE PAIN: 0
SHORTNESS OF BREATH: 0
RHINORRHEA: 1
CHILLS: 0
EYE DISCHARGE: 1
SORE THROAT: 0
PHOTOPHOBIA: 0
COUGH: 1
EYE ITCHING: 0
FACIAL SWELLING: 1
FEVER: 0
EYE REDNESS: 0
HEADACHES: 1
VOICE CHANGE: 0

## 2018-05-24 NOTE — H&P
[facility]    Hospitalist History and Physical    Name: Rigoberto Dowd    MRN: [id number]  YOB: 1980    Age: 37 year old  Date of Admission:  5/24/2018  Date of Service (when I saw the patient): 05/24/18    Assessment & Plan   Rigoberto Dowd is a 37 year old male with PMH significant for psoriatic arthritis, HTN, MARIANNA compliant with CPAP, previous tobacco abuse (quit in 01/2017), pulmonary nodules, and obesity who presents with left facial swelling. ED workup reveals hypertensive and intermittently tachycardic, BMP and CBC unremarkable. No imaging was performed in the ED. Patient received a dose of IV Vancomycin and IV Zosyn.     1. Left facial cellulitis: two days of progressive swelling that started in the corner of his left eye that has extended to above his left eyebrow, along left nostril, and across his left cheek to his ear with overlying erythema and warmth. Denies any fevers at home and currently afebrile. No leukocytosis. Seen at Urgent Care yesterday and prescribed PO Keflex and Ofloxacin gtt that he took one dose of each yesterday with worsening this morning. No vision changes. No pressure behind left eye. No palpable areas of fluctuance on exam.    - IV Zosyn  - IVF hydration with NS at 100 cc/hour  - Apply ice to area to assist with swelling  - Follow BMP and CBC in AM  - Monitor for fevers    - Pain control with PRN Tylenol, Ibuprofen, and Toradol, patient has h/o hives with Morphine so will avoid narcotics unless pain worsens then reevaluate    - Consider further imaging if pain worsens or vision changes to assess for abscess formation     2. HTN: intermittent elevated blood pressures, suspect partially related to pain  - Continue PTA Amlodipine  - PRN IV Labetalol for SBP >180 or DBP >100    3. Psoriasis and psoriatic arthritis: initially managed by Dr. Rajan of dermatology and underwent UVB treatments. In 01/2018 the patient was evaluated by Dr. Yarbrough of  rheumatology and it was agreed to trial Adalimumab therapy which did not allow for any improvement. Patient recently started Stelara injections 1 month ago and is due for his next injection on Friday this week, he has yet to notice any improvement in his symptoms.     4. MARIANNA: continue nightly CPAP use as able with swelling, PRN supplemental oxygen while sleeping if worsening pain with wearing CPAP     5. Pulmonary nodules: multiple small pulmonary nodules incidentally found on previous CT scan of chest. Recently seen in consultation with Dr. Hahn of pulmonology at [facility]. It is recommended the patient have a f/u CT scan of chest which he was scheduled to have done this afternoon to r/o malignancy. As well fungal serology labs were drawn to r/o underlying active fungal infection due to the patient being immunosuppressed.      6. RLS: continue PTA Mirapex     Pain Assessment: # Pain Assessment:  Current Pain Score 5/24/2018   Patient currently in pain? yes   Pain score (0-10) 1   - Rigoberto is experiencing pain due to left facial cellulitis. Pain management was discussed and the plan was created in a collaborative fashion.  Rigoberto's response to the current recommendations: engaged  - Please see the plan for pain management as documented above    DVT Prophylaxis: Pneumatic Compression Devices  Code Status: Full Code, discussed with patient   Disposition: Expected stay >2 midnights for IV antibiotics and monitoring improvement of infection     Primary Care Physician   [facility]    Chief Complaint   Left facial swelling     History obtained from discussion with ED provider Dr. Ramirez, chart review, and interview with patient.     History of Present Illness   Rigoberto Dowd is a 37 year old male who presents with left facial swelling. The patient states that two nights ago he was in his usual state of health without any signs of swelling on his face. Yesterday morning the patient woke up  to swelling over the corner of his left eye. The patient went about his day and was able to work until 15:00 without any issues. Due to the ongoing swelling the patient went to [facility] where he was prescribed a course of PO Keflex and Ofloxacin eye drops. The patient took one dose of each yesterday evening around 18:00. Due to ongoing pressure in his face the patient took a dose of OTC Ibuprofen. The patient states he felt extremely tired yesterday evening and went to bed early. Overnight the patient could feel the swelling worsening due to the irritation he was having with wearing his CPAP. This morning when the patient woke up his swelling had extended to around his entire eye, now above his eyebrow, to the left side of his nose and his whole left cheek. Since the onset of his symptoms the area of swelling has felt warm and appeared erythematous. The patient notes mild creamy/yellow discharge from his left eye over the last day that would easily go away with blinking. Denies any vision changes. He notes the pressure sensation in the left side of his face is uncomfortable. He has a mild headache and soreness in his jaw and throat. Denies any changes in hearing. Denies any recent fevers, dental pain or procedures, ear pain, or recent insect bite or trauma to his face. Denies any known sick contacts. The patient reports any ongoing productive cough for the last few weeks with increased phlegm production that he felt was either a virus or allergies. Denies nasal congestion or sinus pressure. The patient denies any work where something could of been trapped in his eye. He does not wear contact lenses. The patient states he had an infection of his left ear a few months ago where it was swollen and purple in color but improved with a course of oral antibiotics over 2-3 days. Denies h/o MRSA. The patient is currently being treated with Stelara injections for psoriasis and is due for his next injection this  Friday. Denies h/o DM2.     Past Medical History    Past Medical History:   Diagnosis Date     Arthritis      H/O colonoscopy      H/O hemorrhoids      Psoriasis      Sleep apnea      Past Surgical History   Past Surgical History:   Procedure Laterality Date     EYE SURGERY       GI SURGERY       ORTHOPEDIC SURGERY       Prior to Admission Medications   Prior to Admission Medications   Prescriptions Last Dose Informant Patient Reported? Taking?   AMLODIPINE BESYLATE PO 5/23/2018 at Unknown time  Yes Yes   Sig: Take 5 mg by mouth every evening   Ibuprofen (ADVIL PO) 5/23/2018 at Unknown time  Yes Yes   Sig: Take 600 mg by mouth every 8 hours as needed for moderate pain   Naproxen Sodium (ALEVE PO)   Yes Yes   Sig: Take 220 mg by mouth 2 times daily as needed for moderate pain   Pramipexole Dihydrochloride (MIRAPEX PO) 5/23/2018 at Unknown time  Yes Yes   Sig: Take 1 mg by mouth At Bedtime    cephALEXin (KEFLEX) 500 MG capsule 5/23/2018 at Unknown time  No Yes   Sig: Take 1 capsule (500 mg) by mouth 3 times daily for 7 days   hydrocortisone 2.5 % ointment Past Month at Unknown time  Yes Yes   Sig: Apply topically 3 times daily as needed   ofloxacin (OCUFLOX) 0.3 % ophthalmic solution 5/23/2018 at Unknown time  No Yes   Sig: Place 1 drop Into the left eye 4 times daily for 7 days   ustekinumab (STELARA) 45 MG/0.5ML SOSY 1 month ago  Yes Yes   Sig: Inject 45 mg Subcutaneous every 3 months       Facility-Administered Medications: None     Allergies   Allergies   Allergen Reactions     Morphine      Social History   Social History   Substance Use Topics     Smoking status: Former Smoker     Smokeless tobacco: Never Used     Alcohol use Yes      Comment: occ     Social History     Social History Narrative     Family History   Family history reviewed with patient and is noncontributory.    Review of Systems   A Comprehensive greater than 10 system review of systems was carried out.  Pertinent positives and negatives are  noted above.  Otherwise negative for contributory information.    Physical Exam   Temp: 98.3  F (36.8  C) Temp src: Oral BP: 144/88   Heart Rate: 102 Resp: 18 SpO2: 96 % O2 Device: None (Room air)    Vital Signs with Ranges  Temp:  [98.3  F (36.8  C)-99.1  F (37.3  C)] 98.3  F (36.8  C)  Pulse:  [101] 101  Heart Rate:  [] 102  Resp:  [18] 18  BP: (133-174)/() 144/88  SpO2:  [94 %-97 %] 96 %  285 lbs 0 oz    GEN:  Alert, oriented x 3, appears comfortable, no overt distress  HEENT:  Normocephalic/atraumatic, no scleral icterus, no nasal discharge, mouth moist. Left eye swelling that extends from above his left eyebrow along his left nostril and extends over his left cheek that appears tight. EOMIs.     CV:  Regular rate and rhythm, no murmur or JVD.  S1 + S2 noted, no S3 or S4.  LUNGS:  Clear to auscultation bilaterally without rales/rhonchi/wheezing/retractions.  Symmetric chest rise on inhalation noted.  ABD:  Active bowel sounds, soft, non-tender/non-distended.  No rebound/guarding/rigidity.  EXT:  No edema.  No cyanosis.  No acute joint synovitis noted.  SKIN:  Dry to touch. Area over left side of face is erythematous and warm to touch, no purulent drainage present.   NEURO:  Symmetric muscle strength, sensation to touch grossly intact.  Coordination symmetric on general exam.  No new focal deficits appreciated.    Data   Data reviewed today:  I personally reviewed all labs and imaging from this visit.     Results for orders placed or performed during the hospital encounter of 05/24/18   CBC with platelets differential   Result Value Ref Range    WBC 9.8 4.0 - 11.0 10e9/L    RBC Count 4.66 4.4 - 5.9 10e12/L    Hemoglobin 14.5 13.3 - 17.7 g/dL    Hematocrit 42.0 40.0 - 53.0 %    MCV 90 78 - 100 fl    MCH 31.1 26.5 - 33.0 pg    MCHC 34.5 31.5 - 36.5 g/dL    RDW 12.5 10.0 - 15.0 %    Platelet Count 275 150 - 450 10e9/L    Diff Method Automated Method     % Neutrophils 69.7 %    % Lymphocytes 19.8 %    %  Monocytes 7.9 %    % Eosinophils 1.9 %    % Basophils 0.4 %    % Immature Granulocytes 0.3 %    Nucleated RBCs 0 0 /100    Absolute Neutrophil 6.8 1.6 - 8.3 10e9/L    Absolute Lymphocytes 1.9 0.8 - 5.3 10e9/L    Absolute Monocytes 0.8 0.0 - 1.3 10e9/L    Absolute Eosinophils 0.2 0.0 - 0.7 10e9/L    Absolute Basophils 0.0 0.0 - 0.2 10e9/L    Abs Immature Granulocytes 0.0 0 - 0.4 10e9/L    Absolute Nucleated RBC 0.0    Basic metabolic panel   Result Value Ref Range    Sodium 138 133 - 144 mmol/L    Potassium 4.0 3.4 - 5.3 mmol/L    Chloride 106 94 - 109 mmol/L    Carbon Dioxide 27 20 - 32 mmol/L    Anion Gap 5 3 - 14 mmol/L    Glucose 110 (H) 70 - 99 mg/dL    Urea Nitrogen 10 7 - 30 mg/dL    Creatinine 0.83 0.66 - 1.25 mg/dL    GFR Estimate >90 >60 mL/min/1.7m2    GFR Estimate If Black >90 >60 mL/min/1.7m2    Calcium 9.0 8.5 - 10.1 mg/dL     Chayo HUTTON I discussed the patient with Dr. Marcial and he agrees with the above plan.

## 2018-05-24 NOTE — IP AVS SNAPSHOT
[facility]    [address]    [facility] [address]    Phone:  [phone]    Fax:  [phone]                                       After Visit Summary   5/24/2018    Rigoberto Dowd    MRN: [id number]           After Visit Summary Signature Page     I have received my discharge instructions, and my questions have been answered. I have discussed any challenges I see with this plan with the nurse or doctor.    ..........................................................................................................................................  Patient/Patient Representative Signature      ..........................................................................................................................................  Patient Representative Print Name and Relationship to Patient    ..................................................               ................................................  Date                                            Time    ..........................................................................................................................................  Reviewed by Signature/Title    ...................................................              ..............................................  Date                                                            Time

## 2018-05-24 NOTE — PLAN OF CARE
Problem: Patient Care Overview  Goal: Plan of Care/Patient Progress Review  Outcome: No Change  Afebrile, VSS, pain controlled with Tylenol, Left orbital swelling and redness present, IV patent and infusing, tolerating regular diet, drinking and voiding, up in room independently. Continue IV antibiotic as ordered.

## 2018-05-24 NOTE — ED NOTES
[facility]  ED Nurse Handoff Report    Rigoberto Dowd is a 37 year old male   ED Chief complaint: Facial Swelling  . ED Diagnosis:   Final diagnoses:   Facial cellulitis     Allergies:   Allergies   Allergen Reactions     Morphine        Code Status: Not on file  Activity level - Baseline/Home:  Independent. Activity Level - Current:   Independent. Lift room needed: No. Bariatric: No   Needed: No   Isolation: No. Infection: Not Applicable.     Vital Signs:   Vitals:    05/24/18 0745 05/24/18 0800 05/24/18 0815 05/24/18 0830   BP: (!) 142/99 (!) 144/100 (!) 151/100 (!) 141/94   Resp:       Temp:       TempSrc:       SpO2: 94% 94% 94% 96%   Weight:           Cardiac Rhythm:  ,      Pain level: 0-10 Pain Scale: 0  Patient confused: No. Patient Falls Risk: No.   Elimination Status: Has not yet voided  Patient Report - Initial Complaint: Facial Swelling Focused Assessment:Rigoberto Dowd is a 37 year old male with a history of psoriatic arthritis and hypertension who presents to the ED for evaluation of facial swelling. The patient reports going to bed feeling normal 2 nights ago and woke up yesterday morning with swelling around his left eye. He went to work as usual and around 1500, he went to the [facility]. He notes that the swelling was not this severe at that time. He was given Keflex and Ocfloxacin, which he reports taking 1 dose of each last night. Patient was able to feel the swelling to be getting worse throughout last night. He woke up this morning with swelling surrounding his entire left eye and feeling it radiate up above his eyebrow and down into his cheek area. Patient notes some eye discharge but no visual changes. He notes that his right eye is now feeling a little swollen. Patient has also developed a headache and soreness of the jaw and throat area. He denies any fevers at home, dental pain, ear pain, or recent facial trauma. He is not having difficulty breathing  "or swallowing. Patient has had a cough and cold symptoms recently and is unsure if they are related to this swelling. Patient does not wear contact lenses as he had Lasik eye surgery about 10 years ago. He was started on Stelara 1 month ago where he received 2 shots and is supposed to have another one tomorrow. The patient does have a history of facial swelling in his left ear that was very purple and swelled up like a \"wrestler's ear\" does. He was given antibiotics at this time and got better within 2-3 days. He is concerned about this current presentation because his last episode of facial swelling never got worse like it is this time. Of note, patient's coworker was diagnosed with shingles recently.   Tests Performed: Blood work Abnormal Results:   Labs Ordered and Resulted from Time of ED Arrival Up to the Time of Departure from the ED   BASIC METABOLIC PANEL - Abnormal; Notable for the following:        Result Value    Glucose 110 (*)     All other components within normal limits   CBC WITH PLATELETS DIFFERENTIAL   PERIPHERAL IV CATHETER     Treatments provided: Antibiotics    Family Comments:   OBS brochure/video discussed/provided to patient:  N/A  ED Medications:   Medications   ampicillin-sulbactam (UNASYN) 3 g vial to attach to  mL bag (0 g Intravenous Stopped 5/24/18 0854)   vancomycin (VANCOCIN) 2,500 mg in sodium chloride 0.9 % 500 mL intermittent infusion (0 mg Intravenous ED Infusing on Admission/transfer 5/24/18 0901)     Drips infusing:  Yes  For the majority of the shift, the patient's behavior Green. Interventions performed were NA.     Severe Sepsis OR Septic Shock Diagnosis Present: No      ED Nurse Name/Phone Number: Aleena Blanco,   8:16 AM    RECEIVING UNIT ED HANDOFF REVIEW    Above ED Nurse Handoff Report was reviewed: Yes  Reviewed by: Sammie Koehler on May 24, 2018 at 8:52 AM     "

## 2018-05-24 NOTE — IP AVS SNAPSHOT
MRN:[id number]                      After Visit Summary   5/24/2018    Rigoberto Dowd    MRN: [id number]           Thank you!     Thank you for choosing [facility] for your care. Our goal is always to provide you with excellent care. Hearing back from our patients is one way we can continue to improve our services. Please take a few minutes to complete the written survey that you may receive in the mail after you visit. If you would like to speak to someone directly about your visit please contact Patient Relations at [phone]. Thank you!          Patient Information     Date Of Birth          1980        Designated Caregiver       Most Recent Value    Caregiver    Will someone help with your care after discharge? no      About your hospital stay     You were admitted on:  May 24, 2018 You last received care in the:  [facility]    You were discharged on:  May 27, 2018        Reason for your hospital stay       Facial cellulitis                  Who to Call     For medical emergencies, please call 911.  For non-urgent questions about your medical care, please call your primary care provider or clinic, [phone]          Attending Provider     Provider Specialty    Tonya Ramirez MD Emergency Medicine    [facility]       Primary Care Provider Office Phone # Fax #    [facility] [phone]      After Care Instructions     Activity       Your activity upon discharge: activity as tolerated            Diet       Follow this diet upon discharge: Orders Placed This Encounter      Combination Diet Regular Diet Adult                  Follow-up Appointments     Follow-up and recommended labs and tests        Follow up with primary care provider, [facility], within 7 days for hospital follow- up.    F/up with dermatology/rheumatology in 1-2 weeks.                  Pending Results      No orders found from 5/22/2018 to 5/25/2018.            Statement of Approval     Ordered          05/27/18 1218  I have reviewed and agree with all the recommendations and orders detailed in this document.  EFFECTIVE NOW     Approved and electronically signed by:  Lucho Hermosillo MD             Admission Information     Date & Time Provider Department Dept. Phone    5/24/2018 Gregg Marcial MD [facility] [phone]      Your Vitals Were     Blood Pressure Temperature Respirations Weight Pulse Oximetry BMI (Body Mass Index)    142/96 97.9  F (36.6  C) 16 129.3 kg (285 lb) 95% 38.65 kg/m2      MyChart Information     [facility] gives you secure access to your electronic health record. If you see a primary care provider, you can also send messages to your care team and make appointments. If you have questions, please call your primary care clinic.  If you do not have a primary care provider, please call [phone] and they will assist you.        Care EveryWhere ID     This is your Care EveryWhere ID. This could be used by other organizations to access your [facility] medical records  [id number]        Equal Access to Services     JACOB CHARLES AH: Hadcristi Grey, rajesh callahan, varun england. So [facility] [phone].    ATENCIÓN: Si habla español, tiene a christopher disposición servicios gratuitos de asistencia lingüística. [facility] [phone].    We comply with applicable federal civil rights laws and [place] laws. We do not discriminate on the basis of race, color, national origin, age, disability, sex, sexual orientation, or gender identity.               Review of your medicines      START taking        Dose / Directions    amoxicillin-clavulanate 875-125 MG per tablet   Commonly known as:  AUGMENTIN        Dose:  1 tablet   Take 1 tablet by mouth 2 times daily for 7 days   Quantity:  14 tablet   Refills:  0          CONTINUE these medicines which have NOT CHANGED        Dose / Directions    ADVIL PO        Dose:  600 mg   Take 600 mg by mouth every 8 hours as needed for moderate pain   Refills:  0       ALEVE PO        Dose:  220 mg   Take 220 mg by mouth 2 times daily as needed for moderate pain   Refills:  0       AMLODIPINE BESYLATE PO        Dose:  5 mg   Take 5 mg by mouth every evening   Refills:  0       hydrocortisone 2.5 % ointment        Apply topically 3 times daily as needed   Refills:  0       MIRAPEX PO        Dose:  1 mg   Take 1 mg by mouth At Bedtime   Refills:  0       ofloxacin 0.3 % ophthalmic solution   Commonly known as:  OCUFLOX   Used for:  Swelling of eye, left        Dose:  1 drop   Place 1 drop Into the left eye 4 times daily for 7 days   Quantity:  5 mL   Refills:  0       ustekinumab 45 MG/0.5ML Sosy   Commonly known as:  STELARA        Dose:  45 mg   Inject 45 mg Subcutaneous every 3 months   Refills:  0         STOP taking     cephALEXin 500 MG capsule   Commonly known as:  KEFLEX                Where to get your medicines      These medications were sent to [facility] [place] [address] [address] [address]     Phone:  [phone]     amoxicillin-clavulanate 875-125 MG per tablet                Protect others around you: Learn how to safely use, store and throw away your medicines at www.disposemymeds.org.        ANTIBIOTIC INSTRUCTION     You've Been Prescribed an Antibiotic - Now What?  Your healthcare team thinks that you or your loved one might have an infection. Some infections can be treated with antibiotics, which are powerful, life-saving drugs. Like all medications, antibiotics have side effects and should only be used when necessary. There are some important things you should know about your antibiotic treatment.      Your healthcare team may run tests before you start taking an antibiotic.    Your team may take samples  (e.g., from your blood, urine or other areas) to run tests to look for bacteria. These test can be important to determine if you need an antibiotic at all and, if you do, which antibiotic will work best.      Within a few days, your healthcare team might change or even stop your antibiotic.    Your team may start you on an antibiotic while they are working to find out what is making you sick.    Your team might change your antibiotic because test results show that a different antibiotic would be better to treat your infection.    In some cases, once your team has more information, they learn that you do not need an antibiotic at all. They may find out that you don't have an infection, or that the antibiotic you're taking won't work against your infection. For example, an infection caused by a virus can't be treated with antibiotics. Staying on an antibiotic when you don't need it is more likely to be harmful than helpful.      You may experience side effects from your antibiotic.    Like all medications, antibiotics have side effects. Some of these can be serious.    Let you healthcare team know if you have any known allergies when you are admitted to the hospital.    One significant side effect of nearly all antibiotics is the risk of severe and sometimes deadly diarrhea caused by Clostridium difficile (C. Difficile). This occurs when a person takes antibiotics because some good germs are destroyed. Antibiotic use allows C. diificile to take over, putting patients at high risk for this serious infection.    As a patient or caregiver, it is important to understand your or your loved one's antibiotic treatment. It is especially important for caregivers to speak up when patients can't speak for themselves. Here are some important questions to ask your healthcare team.    What infection is this antibiotic treating and how do you know I have that infection?    What side effects might occur from this antibiotic?    How  long will I need to take this antibiotic?    Is it safe to take this antibiotic with other medications or supplements (e.g., vitamins) that I am taking?     Are there any special directions I need to know about taking this antibiotic? For example, should I take it with food?    How will I be monitored to know whether my infection is responding to the antibiotic?    What tests may help to make sure the right antibiotic is prescribed for me?      Information provided by:  www.cdc.gov/getsmart  U.S. Department of Health and Human Services  Centers for disease Control and Prevention  National Center for Emerging and Zoonotic Infectious Diseases  Division of Healthcare Quality Promotion             Medication List: This is a list of all your medications and when to take them. Check marks below indicate your daily home schedule. Keep this list as a reference.      Medications           Morning Afternoon Evening Bedtime As Needed    ADVIL PO   Take 600 mg by mouth every 8 hours as needed for moderate pain                                ALEVE PO   Take 220 mg by mouth 2 times daily as needed for moderate pain                                AMLODIPINE BESYLATE PO   Take 5 mg by mouth every evening   Last time this was given:  5 mg on 5/26/2018  9:02 PM                                amoxicillin-clavulanate 875-125 MG per tablet   Commonly known as:  AUGMENTIN   Take 1 tablet by mouth 2 times daily for 7 days                                hydrocortisone 2.5 % ointment   Apply topically 3 times daily as needed                                MIRAPEX PO   Take 1 mg by mouth At Bedtime   Last time this was given:  1 mg on 5/26/2018  9:02 PM                                ofloxacin 0.3 % ophthalmic solution   Commonly known as:  OCUFLOX   Place 1 drop Into the left eye 4 times daily for 7 days                                ustekinumab 45 MG/0.5ML Sosy   Commonly known as:  STELARA   Inject 45 mg Subcutaneous every 3 months

## 2018-05-24 NOTE — PHARMACY-ADMISSION MEDICATION HISTORY
Admission medication history interview status for this patient is complete. See [facility] admission navigator for allergy information, prior to admission medications and immunization status.     Medication history interview source(s):Patient  Medication history resources (including written lists, pill bottles, clinic record):[facility] called  Primary pharmacy:[facility]    Changes made to PTA medication list:  Added: Hydrocortisone Oint, Amlodipine, Aleve, Advil  Deleted:   Changed: Stelara (just started one month ago) after this next shot will go to q 3 months. Mirapex,     Actions taken by pharmacist (provider contacted, etc):None     Additional medication history information:None    Medication reconciliation/reorder completed by provider prior to medication history? No    Do you take OTC medications (eg tylenol, ibuprofen, fish oil, eye/ear drops, etc)? Y(Y/N)    For patients on insulin therapy: NA (Y/N)        Prior to Admission medications    Medication Sig Last Dose Taking? Auth Provider   AMLODIPINE BESYLATE PO Take 5 mg by mouth every evening 5/23/2018 at Unknown time Yes Unknown, Entered By History   cephALEXin (KEFLEX) 500 MG capsule Take 1 capsule (500 mg) by mouth 3 times daily for 7 days 5/23/2018 at Unknown time Yes Mellisa Ferrari PA-C   hydrocortisone 2.5 % ointment Apply topically 3 times daily as needed Past Month at Unknown time Yes Unknown, Entered By History   Ibuprofen (ADVIL PO) Take 600 mg by mouth every 8 hours as needed for moderate pain 5/23/2018 at Unknown time Yes Unknown, Entered By History   Naproxen Sodium (ALEVE PO) Take 220 mg by mouth 2 times daily as needed for moderate pain  Yes Unknown, Entered By History   ofloxacin (OCUFLOX) 0.3 % ophthalmic solution Place 1 drop Into the left eye 4 times daily for 7 days 5/23/2018 at Unknown time Yes Mellisa Ferrari PA-C   Pramipexole Dihydrochloride (MIRAPEX PO) Take 1 mg by mouth At Bedtime  5/23/2018 at Unknown time Yes  Reported, Patient   ustekinumab (STELARA) 45 MG/0.5ML SOSY Inject 45 mg Subcutaneous every 3 months  1 month ago Yes Reported, Patient

## 2018-05-24 NOTE — ED TRIAGE NOTES
Alert and oriented x 3 airway,breathing and circulation intact, swelling in left eye, worse over last few hours, seen atr UC and on meds

## 2018-05-25 LAB
ANION GAP SERPL CALCULATED.3IONS-SCNC: 3 MMOL/L (ref 3–14)
BASOPHILS # BLD AUTO: 0 10E9/L (ref 0–0.2)
BASOPHILS NFR BLD AUTO: 0.6 %
BUN SERPL-MCNC: 9 MG/DL (ref 7–30)
CALCIUM SERPL-MCNC: 8.7 MG/DL (ref 8.5–10.1)
CHLORIDE SERPL-SCNC: 106 MMOL/L (ref 94–109)
CO2 SERPL-SCNC: 30 MMOL/L (ref 20–32)
CREAT SERPL-MCNC: 0.9 MG/DL (ref 0.66–1.25)
DIFFERENTIAL METHOD BLD: ABNORMAL
EOSINOPHIL # BLD AUTO: 0.3 10E9/L (ref 0–0.7)
EOSINOPHIL NFR BLD AUTO: 4 %
ERYTHROCYTE [DISTWIDTH] IN BLOOD BY AUTOMATED COUNT: 12.4 % (ref 10–15)
GFR SERPL CREATININE-BSD FRML MDRD: >90 ML/MIN/1.7M2
GLUCOSE SERPL-MCNC: 115 MG/DL (ref 70–99)
HCT VFR BLD AUTO: 40 % (ref 40–53)
HGB BLD-MCNC: 13.7 G/DL (ref 13.3–17.7)
IMM GRANULOCYTES # BLD: 0 10E9/L (ref 0–0.4)
IMM GRANULOCYTES NFR BLD: 0.4 %
LYMPHOCYTES # BLD AUTO: 2.2 10E9/L (ref 0.8–5.3)
LYMPHOCYTES NFR BLD AUTO: 32.1 %
MCH RBC QN AUTO: 31.5 PG (ref 26.5–33)
MCHC RBC AUTO-ENTMCNC: 34.3 G/DL (ref 31.5–36.5)
MCV RBC AUTO: 92 FL (ref 78–100)
MONOCYTES # BLD AUTO: 0.5 10E9/L (ref 0–1.3)
MONOCYTES NFR BLD AUTO: 7.6 %
NEUTROPHILS # BLD AUTO: 3.7 10E9/L (ref 1.6–8.3)
NEUTROPHILS NFR BLD AUTO: 55.3 %
NRBC # BLD AUTO: 0 10*3/UL
NRBC BLD AUTO-RTO: 0 /100
PLATELET # BLD AUTO: 232 10E9/L (ref 150–450)
POTASSIUM SERPL-SCNC: 4 MMOL/L (ref 3.4–5.3)
RBC # BLD AUTO: 4.35 10E12/L (ref 4.4–5.9)
SODIUM SERPL-SCNC: 139 MMOL/L (ref 133–144)
WBC # BLD AUTO: 6.7 10E9/L (ref 4–11)

## 2018-05-25 PROCEDURE — 36415 COLL VENOUS BLD VENIPUNCTURE: CPT | Performed by: PHYSICIAN ASSISTANT

## 2018-05-25 PROCEDURE — 25000128 H RX IP 250 OP 636: Performed by: PHYSICIAN ASSISTANT

## 2018-05-25 PROCEDURE — 25000132 ZZH RX MED GY IP 250 OP 250 PS 637: Performed by: PHYSICIAN ASSISTANT

## 2018-05-25 PROCEDURE — 99233 SBSQ HOSP IP/OBS HIGH 50: CPT | Performed by: INTERNAL MEDICINE

## 2018-05-25 PROCEDURE — 12000013 ZZH R&B PEDS

## 2018-05-25 PROCEDURE — 85025 COMPLETE CBC W/AUTO DIFF WBC: CPT | Performed by: PHYSICIAN ASSISTANT

## 2018-05-25 PROCEDURE — 80048 BASIC METABOLIC PNL TOTAL CA: CPT | Performed by: PHYSICIAN ASSISTANT

## 2018-05-25 RX ADMIN — TAZOBACTAM SODIUM AND PIPERACILLIN SODIUM 3.38 G: 375; 3 INJECTION, SOLUTION INTRAVENOUS at 06:51

## 2018-05-25 RX ADMIN — SODIUM CHLORIDE: 9 INJECTION, SOLUTION INTRAVENOUS at 06:52

## 2018-05-25 RX ADMIN — KETOROLAC TROMETHAMINE 30 MG: 30 INJECTION, SOLUTION INTRAMUSCULAR at 16:40

## 2018-05-25 RX ADMIN — PRAMIPEXOLE DIHYDROCHLORIDE 1 MG: 0.25 TABLET ORAL at 19:20

## 2018-05-25 RX ADMIN — TAZOBACTAM SODIUM AND PIPERACILLIN SODIUM 3.38 G: 375; 3 INJECTION, SOLUTION INTRAVENOUS at 19:09

## 2018-05-25 RX ADMIN — AMLODIPINE BESYLATE 5 MG: 5 TABLET ORAL at 19:13

## 2018-05-25 RX ADMIN — TAZOBACTAM SODIUM AND PIPERACILLIN SODIUM 3.38 G: 375; 3 INJECTION, SOLUTION INTRAVENOUS at 01:30

## 2018-05-25 RX ADMIN — TAZOBACTAM SODIUM AND PIPERACILLIN SODIUM 3.38 G: 375; 3 INJECTION, SOLUTION INTRAVENOUS at 12:46

## 2018-05-25 NOTE — PLAN OF CARE
Problem: Patient Care Overview  Goal: Plan of Care/Patient Progress Review  Outcome: No Change  Afebrile.  Redness and swelling remains unchanged on Left side of face.  C/o of sore throat.  Comfortable on Toradol.  Up ambulating to bathroom and in hallway independently.  Cont with plan of care.

## 2018-05-25 NOTE — PLAN OF CARE
Problem: Patient Care Overview  Goal: Plan of Care/Patient Progress Review  Outcome: Improving  Afebrile, VSS, declining pain medications at this time, Left facial swelling and redness improving, tolerating regular diet, drinking and voiding, up independently in room.

## 2018-05-25 NOTE — PLAN OF CARE
Problem: Patient Care Overview  Goal: Plan of Care/Patient Progress Review  Denies pain. Patient feels that the swelling on the left side of face especially around the left eye has increased. HOB elevated and ice pack applied. He is able to open the eye a slit. No drainage noted. Tissue on the left side of face is red and warm. No fever during the night. He has foul breath. Plan for AM labs to be drawn. IV infusing at 100 cc's/hr. Has had his CPAP on all night.

## 2018-05-25 NOTE — PROGRESS NOTES
[facility]  Hospitalist Progress Note  Lucho Hermosillo MD 05/25/2018    Reason for Stay (Diagnosis): Left facial cellulitis         Assessment and Plan:      Summary of Stay: Rigoberto Dowd is a 37 year old male a with history of psoriatic arthritis, hypertension, obstructive sleep apnea on CPAP, pulmonary nodule, obesity, who presented with left face swelling redness and admitted with left facial cellulitis on 5/24/2018.    Problem List:   1. Left facial cellulitis.  -Continue IV antibiotics Zosyn, apply ice pack to the area of the face  -Monitor for fever.  -Culture was not done on admission, we will do blood cultures if he spikes fever.    2.  Hypertension.  -Continue PT amlodipine, IV labetalol with parameters.    2. Psoriasis/psoriatic arthritis.    3. Obstructive sleep apnea on CPAP.    4. Pulmonary nodules-also planned follow-up as an outpatient.    # Pain Assessment:  Current Pain Score 5/25/2018   Patient currently in pain? denies   Pain score (0-10) -   Pain location -   Rigoberto de pain level was assessed and he currently denies pain.      DVT Prophylaxis: Ambulate every shift  Code Status: Full Code  Discharge Dispo: Home  Estimated Disch Date / # of Days until Disch: 2 days if continue to improve.        Interval History (Subjective):      Patient seen and examined, assumed care today, no new overnight issues, the left face is still swollen and red, no fever or chills, no tenderness.                  Physical Exam:      Last Vital Signs:  BP (!) 144/95  Temp 98  F (36.7  C) (Oral)  Resp 16  Wt 129.3 kg (285 lb)  SpO2 95%  BMI 38.65 kg/m2    I/O last 3 completed shifts:  In: 2241.67 [P.O.:360; I.V.:1881.67]  Out: -   Wt Readings from Last 1 Encounters:   05/24/18 129.3 kg (285 lb)     Current Facility-Administered Medications   Medication     acetaminophen (TYLENOL) tablet 500-1,000 mg     amLODIPine (NORVASC) tablet 5 mg     ketorolac (TORADOL) injection 30 mg     labetalol  (NORMODYNE/TRANDATE) injection 10 mg     melatonin tablet 1 mg     naloxone (NARCAN) injection 0.1-0.4 mg     ondansetron (ZOFRAN-ODT) ODT tab 4 mg    Or     ondansetron (ZOFRAN) injection 4 mg     piperacillin-tazobactam (ZOSYN) infusion 3.375 g     polyethylene glycol (MIRALAX/GLYCOLAX) Packet 17 g     pramipexole (MIRAPEX) tablet 1 mg     prochlorperazine (COMPAZINE) injection 10 mg    Or     prochlorperazine (COMPAZINE) tablet 10 mg    Or     prochlorperazine (COMPAZINE) Suppository 25 mg     senna-docusate (SENOKOT-S;PERICOLACE) 8.6-50 MG per tablet 1 tablet    Or     senna-docusate (SENOKOT-S;PERICOLACE) 8.6-50 MG per tablet 2 tablet       Constitutional: Awake, alert, cooperative, no apparent distress   Respiratory: Clear to auscultation bilaterally, no crackles or wheezing   Cardiovascular: Regular rate and rhythm, normal S1 and S2, and no murmur noted   Abdomen: Normal bowel sounds, soft, non-distended, non-tender   Skin: No rashes, no cyanosis, dry to touch   Neuro: Alert and oriented x3, no weakness, numbness, memory loss   Extremities: No edema, normal range of motion   Other(s):HEENT  significant redness and swelling of the left face and periorbital area, warm, indurated, nontender       All other systems: Negative          Medications:      All current medications were reviewed with changes reflected in problem list.         Data:      All new lab and imaging data was reviewed.   Labs:  No results for input(s): CULT in the last 168 hours.    Recent Labs  Lab 05/25/18  0656 05/24/18  0720    138   POTASSIUM 4.0 4.0   CHLORIDE 106 106   CO2 30 27   ANIONGAP 3 5   * 110*   BUN 9 10   CR 0.90 0.83   GFRESTIMATED >90 >90   GFRESTBLACK >90 >90   CASIMIRO 8.7 9.0       Recent Labs  Lab 05/25/18  0656 05/24/18  0720   WBC 6.7 9.8   HGB 13.7 14.5   HCT 40.0 42.0   MCV 92 90    275       Recent Labs  Lab 05/25/18  0656 05/24/18  0720   [facility] 115* 110*      Imaging:   Results for orders placed or  performed in visit on 03/11/18   XR Skull 1/3 Views    Narrative    SKULL ONE VIEW LATERAL   3/11/2018 10:32 AM     HISTORY: Left ear pain.    COMPARISON: None.      Impression    IMPRESSION: Normal.    CHANDNI GREENFIELD MD

## 2018-05-26 PROCEDURE — 99232 SBSQ HOSP IP/OBS MODERATE 35: CPT | Performed by: INTERNAL MEDICINE

## 2018-05-26 PROCEDURE — 12000013 ZZH R&B PEDS

## 2018-05-26 PROCEDURE — 25000132 ZZH RX MED GY IP 250 OP 250 PS 637: Performed by: PHYSICIAN ASSISTANT

## 2018-05-26 PROCEDURE — 25000128 H RX IP 250 OP 636: Performed by: PHYSICIAN ASSISTANT

## 2018-05-26 RX ADMIN — TAZOBACTAM SODIUM AND PIPERACILLIN SODIUM 3.38 G: 375; 3 INJECTION, SOLUTION INTRAVENOUS at 19:21

## 2018-05-26 RX ADMIN — TAZOBACTAM SODIUM AND PIPERACILLIN SODIUM 3.38 G: 375; 3 INJECTION, SOLUTION INTRAVENOUS at 01:22

## 2018-05-26 RX ADMIN — TAZOBACTAM SODIUM AND PIPERACILLIN SODIUM 3.38 G: 375; 3 INJECTION, SOLUTION INTRAVENOUS at 06:49

## 2018-05-26 RX ADMIN — TAZOBACTAM SODIUM AND PIPERACILLIN SODIUM 3.38 G: 375; 3 INJECTION, SOLUTION INTRAVENOUS at 12:57

## 2018-05-26 RX ADMIN — AMLODIPINE BESYLATE 5 MG: 5 TABLET ORAL at 21:02

## 2018-05-26 RX ADMIN — PRAMIPEXOLE DIHYDROCHLORIDE 1 MG: 0.25 TABLET ORAL at 21:02

## 2018-05-26 NOTE — PLAN OF CARE
Problem: Patient Care Overview  Goal: Plan of Care/Patient Progress Review  Outcome: Improving  VSS. Denies pain. Reports that the swelling has gone down and that it feels a little better. Showered.

## 2018-05-26 NOTE — PLAN OF CARE
Problem: Patient Care Overview  Goal: Plan of Care/Patient Progress Review  Outcome: Therapy, progress toward functional goals as expected  Afebrile. Left side of face remains swollen and reddened. Patient reports slight blurriness of left eye. Toradol x1 for pain. Patient continues to receive zosyn. FRED OLMEDO. Independent in room. Will continue to monitor.

## 2018-05-26 NOTE — PLAN OF CARE
Problem: Skin and Soft Tissue Infection (Adult)  Goal: Signs and Symptoms of Listed Potential Problems Will be Absent, Minimized or Managed (Skin and Soft Tissue Infection)  Signs and symptoms of listed potential problems will be absent, minimized or managed by discharge/transition of care (reference Skin and Soft Tissue Infection (Adult) CPG).   Outcome: No Change  Shift report:  VSS, afebrile, resting well with c-pap on, offers no c/o pain , left side of face still very swollen, puffy around the left eye, lungs clear, no respiratory distress, sats 95-96 % on room air, will continue to monitor closely and provide for needs

## 2018-05-26 NOTE — PROGRESS NOTES
[facility]  Hospitalist Progress Note  Lucho Hermosillo MD 05/26/2018    Reason for Stay (Diagnosis): Left facial cellulitis         Assessment and Plan:      Summary of Stay: Rigoberto Dowd is a 37 year old male a with history of psoriatic arthritis, hypertension, obstructive sleep apnea on CPAP, pulmonary nodule, obesity, who presented with left face swelling redness and admitted with left facial cellulitis on 5/24/2018.    Problem List:   1. Left facial cellulitis.  -Continue IV antibiotics Zosyn, apply ice pack to the area of the face.  -Monitor for fever.  -Culture was not done on admission, we will do blood cultures if he spikes fever.    2.  Hypertension.  -Increase amlodipine to 10 mg p.o. daily, IV labetalol with parameters.    2. Psoriasis/psoriatic arthritis.  Patient is on Stelara every 3 months    3. Obstructive sleep apnea on CPAP.    4. Pulmonary nodules- He has a planned follow-up as an outpatient.    # Pain Assessment:  Current Pain Score 5/26/2018   Patient currently in pain? denies   Pain score (0-10) -   Pain location -   Pain descriptors -   Rigoberto de pain level was assessed and he currently denies pain.      DVT Prophylaxis: Ambulate every shift  Code Status: Full Code  Discharge Dispo: Home  Estimated Disch Date / # of Days until Disch: 1 days if continue to improve.        Interval History (Subjective):      Patient seen and examined, no new overnight issues, the left face is swelling and redness is better, no fever or chills, no tenderness.  Complaining of some blurring of vision on the left side, which clears itself with cleaning the eye.                  Physical Exam:      Last Vital Signs:  BP (!) 148/95  Temp 97.5  F (36.4  C) (Oral)  Resp 16  Wt 129.3 kg (285 lb)  SpO2 97%  BMI 38.65 kg/m2    I/O last 3 completed shifts:  In: 700 [P.O.:700]  Out: -   Wt Readings from Last 1 Encounters:   05/24/18 129.3 kg (285 lb)     Current Facility-Administered Medications    Medication     acetaminophen (TYLENOL) tablet 500-1,000 mg     amLODIPine (NORVASC) tablet 5 mg     labetalol (NORMODYNE/TRANDATE) injection 10 mg     melatonin tablet 1 mg     naloxone (NARCAN) injection 0.1-0.4 mg     ondansetron (ZOFRAN-ODT) ODT tab 4 mg    Or     ondansetron (ZOFRAN) injection 4 mg     piperacillin-tazobactam (ZOSYN) infusion 3.375 g     polyethylene glycol (MIRALAX/GLYCOLAX) Packet 17 g     pramipexole (MIRAPEX) tablet 1 mg     prochlorperazine (COMPAZINE) injection 10 mg    Or     prochlorperazine (COMPAZINE) tablet 10 mg    Or     prochlorperazine (COMPAZINE) Suppository 25 mg     senna-docusate (SENOKOT-S;PERICOLACE) 8.6-50 MG per tablet 1 tablet    Or     senna-docusate (SENOKOT-S;PERICOLACE) 8.6-50 MG per tablet 2 tablet     sodium chloride (PF) 0.9% PF flush 3 mL       Constitutional: Awake, alert, cooperative, no apparent distress   Respiratory: Clear to auscultation bilaterally, no crackles or wheezing   Cardiovascular: Regular rate and rhythm, normal S1 and S2, and no murmur noted   Abdomen: Normal bowel sounds, soft, non-distended, non-tender   Skin: No rashes, no cyanosis, dry to touch   Neuro: Alert and oriented x3, no weakness, numbness, memory loss   Extremities: No edema, normal range of motion   Other(s):HEENT Significant redness and swelling of the left face and periorbital area, warm, indurated, nontender       All other systems: Negative          Medications:      All current medications were reviewed with changes reflected in problem list.         Data:      All new lab and imaging data was reviewed.   Labs:  No results for input(s): CULT in the last 168 hours.    Recent Labs  Lab 05/25/18  0656 05/24/18  0720    138   POTASSIUM 4.0 4.0   CHLORIDE 106 106   CO2 30 27   ANIONGAP 3 5   * 110*   BUN 9 10   CR 0.90 0.83   GFRESTIMATED >90 >90   GFRESTBLACK >90 >90   CASIMIRO 8.7 9.0       Recent Labs  Lab 05/25/18  0656 05/24/18  0720   WBC 6.7 9.8   HGB 13.7 14.5    HCT 40.0 42.0   MCV 92 90    275       Recent Labs  Lab 05/25/18  0656 05/24/18  0720   * 110*      Imaging:   Results for orders placed or performed in visit on 03/11/18   XR Skull 1/3 Views    Narrative    SKULL ONE VIEW LATERAL   3/11/2018 10:32 AM     HISTORY: Left ear pain.    COMPARISON: None.      Impression    IMPRESSION: Normal.    CHANDNI GREENFIELD MD

## 2018-05-27 VITALS
SYSTOLIC BLOOD PRESSURE: 142 MMHG | DIASTOLIC BLOOD PRESSURE: 96 MMHG | BODY MASS INDEX: 38.65 KG/M2 | RESPIRATION RATE: 16 BRPM | WEIGHT: 285 LBS | TEMPERATURE: 97.9 F | OXYGEN SATURATION: 95 %

## 2018-05-27 PROCEDURE — 99238 HOSP IP/OBS DSCHRG MGMT 30/<: CPT | Performed by: INTERNAL MEDICINE

## 2018-05-27 PROCEDURE — 25000128 H RX IP 250 OP 636: Performed by: PHYSICIAN ASSISTANT

## 2018-05-27 RX ADMIN — TAZOBACTAM SODIUM AND PIPERACILLIN SODIUM 3.38 G: 375; 3 INJECTION, SOLUTION INTRAVENOUS at 01:02

## 2018-05-27 RX ADMIN — TAZOBACTAM SODIUM AND PIPERACILLIN SODIUM 3.38 G: 375; 3 INJECTION, SOLUTION INTRAVENOUS at 06:56

## 2018-05-27 NOTE — PLAN OF CARE
Problem: Patient Care Overview  Goal: Plan of Care/Patient Progress Review  Outcome: Adequate for Discharge Date Met: 05/27/18  Denies pain.  Left side facial swelling improving.  C/o slightly blurry vision Left eye; md updated.  Reviewed discharge instructions and medications with patient.  To follow up with pmd in 1 week.  Discharged to home at 1330.

## 2018-05-27 NOTE — DISCHARGE SUMMARY
Admit Date:     05/24/2018   Discharge Date:     05/27/2018      DATE OF ADMISSION:  5/24/2018.        DATE OF DISCHARGE:  5/27/2018.      DISCHARGE DIAGNOSES:   1. Left facial cellulitis.   2. Psoriasis and psoriatic arthritis.   3. Hypertension.   4. Pulmonary nodule.   5. Obstructive sleep apnea.      DISPOSITION:  Home.      DISCHARGE MEDICATIONS:  The patient will be on Augmentin for 7 more days.      Review of your medicines      UNREVIEWED medicines. Ask your doctor about these medicines       Dose / Directions    amoxicillin-clavulanate 875-125 MG per tablet   Commonly known as:  AUGMENTIN   Used for:  Facial cellulitis   Ask about: Should I take this medication?        Dose:  1 tablet   Take 1 tablet by mouth 2 times daily for 7 days   Quantity:  14 tablet   Refills:  0       ofloxacin 0.3 % ophthalmic solution   Commonly known as:  OCUFLOX   Used for:  Swelling of eye, left   Ask about: Should I take this medication?        Dose:  1 drop   Place 1 drop Into the left eye 4 times daily for 7 days   Quantity:  5 mL   Refills:  0         CONTINUE these medicines which have NOT CHANGED       Dose / Directions    ADVIL PO        Dose:  600 mg   Take 600 mg by mouth every 8 hours as needed for moderate pain   Refills:  0       ALEVE PO        Dose:  220 mg   Take 220 mg by mouth 2 times daily as needed for moderate pain   Refills:  0       AMLODIPINE BESYLATE PO        Dose:  5 mg   Take 5 mg by mouth every evening   Refills:  0       hydrocortisone 2.5 % ointment        Apply topically 3 times daily as needed   Refills:  0       MIRAPEX PO        Dose:  1 mg   Take 1 mg by mouth At Bedtime   Refills:  0       ustekinumab 45 MG/0.5ML Sosy   Commonly known as:  STELARA        Dose:  45 mg   Inject 45 mg Subcutaneous every 3 months   Refills:  0         STOP taking          cephALEXin 500 MG capsule   Commonly known as:  KEFLEX                Where to get your medicines      These medications were sent to [facility] [place], [place] - [address] [facility]  [id number] [place] [id number]     Phone:  [phone]      amoxicillin-clavulanate 875-125 MG per tablet              DISCHARGE CONDITION:  Stable.      DISCHARGE VITAL SIGNS:  Blood pressure 140/90, pulse rate 97, oxygen saturation 95%.   HEENT:  Pink, nonicteric.  Extraocular muscle movement intact.  There is a hyperemic area, indurated area on the left cheek, significantly improved.   NECK:  Supple, no JVD, no thyromegaly.   CHEST:  Good air entry bilaterally.  No wheezing, crackles or rales.   CARDIOVASCULAR:  S1, S2 regular, no gallop or murmur.   ABDOMEN:  Soft, nontender, nondistended, positive bowel sounds, no organomegaly.   EXTREMITIES:  No edema, cyanosis or clubbing.        DISCHARGE DIET:  Regular.      DISCHARGE ACTIVITY:  As tolerated.      DISCHARGE FOLLOWUP:    Primary care provider in 1 week.    The patient will follow up with Dermatology/Rheumatologist as an outpatient.      BRIEF HISTORY AND HOSPITAL COURSE:  Dilcia Huston is a 37-year-old gentleman with history of psoriatic arthritis, hypertension, obstructive sleep apnea on CPAP, pulmonary nodule, follow up as an outpatient, obesity who presented with left face swelling, redness and admitted with left facial cellulitis.  There is some eczematous changes which is very likely the source of introduction of the infection.  He was treated with IV Zosyn while in the hospital.  Ice pack was applied.  The redness and the swelling improved.  He had significant periorbital edema, which also improved with antibiotics and remained stable. Today there is a significant improvement and discharged on Augmentin for 7 more days and will follow up with his primary care provider.  All his questions and concerns addressed, showed understanding and discharged.         ANNE VARGAS MD             D: 05/27/2018   T: 05/27/2018   MT: MO      Name:     DILCIA HUSTON   MRN:       [id number]        Account:        [id number]   :      1980           Admit Date:     2018                                  Discharge Date: 2018      Document: [id number]

## 2018-05-27 NOTE — PLAN OF CARE
Problem: Skin and Soft Tissue Infection (Adult)  Goal: Signs and Symptoms of Listed Potential Problems Will be Absent, Minimized or Managed (Skin and Soft Tissue Infection)  Signs and symptoms of listed potential problems will be absent, minimized or managed by discharge/transition of care (reference Skin and Soft Tissue Infection (Adult) CPG).   Outcome: Improving  R.N.  Shift note:  VSS, afebrile,left facial  redness and swelling improving, denies c/o pain, using c-pap, lungs clear, resting well, c/o some back pain that improved with repositioning, will continue to monitor closely and provide for needs

## 2019-10-03 ENCOUNTER — HEALTH MAINTENANCE LETTER (OUTPATIENT)
Age: 39
End: 2019-10-03

## 2021-01-15 ENCOUNTER — HEALTH MAINTENANCE LETTER (OUTPATIENT)
Age: 41
End: 2021-01-15

## 2021-07-13 ENCOUNTER — HOSPITAL ENCOUNTER (EMERGENCY)
Facility: CLINIC | Age: 41
Discharge: LEFT BEFORE DISCHARGE | End: 2021-07-13
Payer: COMMERCIAL

## 2021-08-28 ENCOUNTER — APPOINTMENT (OUTPATIENT)
Dept: CT IMAGING | Facility: CLINIC | Age: 41
End: 2021-08-28
Attending: EMERGENCY MEDICINE
Payer: COMMERCIAL

## 2021-08-28 ENCOUNTER — HOSPITAL ENCOUNTER (EMERGENCY)
Facility: CLINIC | Age: 41
Discharge: HOME | End: 2021-08-28
Attending: EMERGENCY MEDICINE | Admitting: EMERGENCY MEDICINE
Payer: COMMERCIAL

## 2021-08-28 VITALS
HEART RATE: 89 BPM | TEMPERATURE: 97.9 F | OXYGEN SATURATION: 98 % | DIASTOLIC BLOOD PRESSURE: 95 MMHG | SYSTOLIC BLOOD PRESSURE: 136 MMHG | RESPIRATION RATE: 18 BRPM

## 2021-08-28 DIAGNOSIS — B02.29 POST HERPETIC NEURALGIA: ICD-10-CM

## 2021-08-28 DIAGNOSIS — R10.32 ABDOMINAL PAIN, LEFT LOWER QUADRANT: ICD-10-CM

## 2021-08-28 LAB
ALBUMIN SERPL-MCNC: 4.4 G/DL (ref 3.4–5)
ALBUMIN UR-MCNC: NEGATIVE MG/DL
ALP SERPL-CCNC: 89 U/L (ref 40–150)
ALT SERPL W P-5'-P-CCNC: 49 U/L (ref 0–70)
ANION GAP SERPL CALCULATED.3IONS-SCNC: 4 MMOL/L (ref 3–14)
APPEARANCE UR: CLEAR
AST SERPL W P-5'-P-CCNC: 33 U/L (ref 0–45)
BASOPHILS # BLD AUTO: 0 10E3/UL (ref 0–0.2)
BASOPHILS NFR BLD AUTO: 1 %
BILIRUB SERPL-MCNC: 1 MG/DL (ref 0.2–1.3)
BILIRUB UR QL STRIP: NEGATIVE
BUN SERPL-MCNC: 16 MG/DL (ref 7–30)
CALCIUM SERPL-MCNC: 9.9 MG/DL (ref 8.5–10.1)
CHLORIDE BLD-SCNC: 105 MMOL/L (ref 94–109)
CO2 SERPL-SCNC: 28 MMOL/L (ref 20–32)
COLOR UR AUTO: ABNORMAL
CREAT SERPL-MCNC: 0.86 MG/DL (ref 0.66–1.25)
EOSINOPHIL # BLD AUTO: 0.3 10E3/UL (ref 0–0.7)
EOSINOPHIL NFR BLD AUTO: 4 %
ERYTHROCYTE [DISTWIDTH] IN BLOOD BY AUTOMATED COUNT: 13.5 % (ref 10–15)
GFR SERPL CREATININE-BSD FRML MDRD: >90 ML/MIN/1.73M2
GLUCOSE BLD-MCNC: 101 MG/DL (ref 70–99)
GLUCOSE UR STRIP-MCNC: NEGATIVE MG/DL
HCT VFR BLD AUTO: 46.1 % (ref 40–53)
HGB BLD-MCNC: 15.4 G/DL (ref 13.3–17.7)
HGB UR QL STRIP: ABNORMAL
IMM GRANULOCYTES # BLD: 0 10E3/UL
IMM GRANULOCYTES NFR BLD: 0 %
KETONES UR STRIP-MCNC: NEGATIVE MG/DL
LEUKOCYTE ESTERASE UR QL STRIP: NEGATIVE
LYMPHOCYTES # BLD AUTO: 2.1 10E3/UL (ref 0.8–5.3)
LYMPHOCYTES NFR BLD AUTO: 32 %
MCH RBC QN AUTO: 31.2 PG (ref 26.5–33)
MCHC RBC AUTO-ENTMCNC: 33.4 G/DL (ref 31.5–36.5)
MCV RBC AUTO: 94 FL (ref 78–100)
MONOCYTES # BLD AUTO: 0.4 10E3/UL (ref 0–1.3)
MONOCYTES NFR BLD AUTO: 7 %
MUCOUS THREADS #/AREA URNS LPF: PRESENT /LPF
NEUTROPHILS # BLD AUTO: 3.6 10E3/UL (ref 1.6–8.3)
NEUTROPHILS NFR BLD AUTO: 56 %
NITRATE UR QL: NEGATIVE
NRBC # BLD AUTO: 0 10E3/UL
NRBC BLD AUTO-RTO: 0 /100
PH UR STRIP: 5.5 [PH] (ref 5–7)
PLATELET # BLD AUTO: 244 10E3/UL (ref 150–450)
POTASSIUM BLD-SCNC: 4.1 MMOL/L (ref 3.4–5.3)
PROT SERPL-MCNC: 8.8 G/DL (ref 6.8–8.8)
RBC # BLD AUTO: 4.93 10E6/UL (ref 4.4–5.9)
RBC URINE: 2 /HPF
SODIUM SERPL-SCNC: 137 MMOL/L (ref 133–144)
SP GR UR STRIP: 1.02 (ref 1–1.03)
SQUAMOUS EPITHELIAL: <1 /HPF
UROBILINOGEN UR STRIP-MCNC: NORMAL MG/DL
WBC # BLD AUTO: 6.4 10E3/UL (ref 4–11)
WBC URINE: 0 /HPF

## 2021-08-28 PROCEDURE — 96360 HYDRATION IV INFUSION INIT: CPT | Mod: 59

## 2021-08-28 PROCEDURE — 99285 EMERGENCY DEPT VISIT HI MDM: CPT | Mod: 25

## 2021-08-28 PROCEDURE — 250N000009 HC RX 250: Performed by: EMERGENCY MEDICINE

## 2021-08-28 PROCEDURE — 81003 URINALYSIS AUTO W/O SCOPE: CPT | Performed by: EMERGENCY MEDICINE

## 2021-08-28 PROCEDURE — 85025 COMPLETE CBC W/AUTO DIFF WBC: CPT | Performed by: EMERGENCY MEDICINE

## 2021-08-28 PROCEDURE — 96361 HYDRATE IV INFUSION ADD-ON: CPT

## 2021-08-28 PROCEDURE — 74177 CT ABD & PELVIS W/CONTRAST: CPT

## 2021-08-28 PROCEDURE — 36415 COLL VENOUS BLD VENIPUNCTURE: CPT | Performed by: EMERGENCY MEDICINE

## 2021-08-28 PROCEDURE — 80053 COMPREHEN METABOLIC PANEL: CPT | Performed by: EMERGENCY MEDICINE

## 2021-08-28 PROCEDURE — 250N000011 HC RX IP 250 OP 636: Performed by: EMERGENCY MEDICINE

## 2021-08-28 PROCEDURE — 258N000003 HC RX IP 258 OP 636: Performed by: EMERGENCY MEDICINE

## 2021-08-28 RX ORDER — ONDANSETRON 2 MG/ML
4 INJECTION INTRAMUSCULAR; INTRAVENOUS EVERY 30 MIN PRN
Status: DISCONTINUED | OUTPATIENT
Start: 2021-08-28 | End: 2021-08-28 | Stop reason: HOSPADM

## 2021-08-28 RX ORDER — SODIUM CHLORIDE 9 MG/ML
INJECTION, SOLUTION INTRAVENOUS CONTINUOUS
Status: DISCONTINUED | OUTPATIENT
Start: 2021-08-28 | End: 2021-08-28 | Stop reason: HOSPADM

## 2021-08-28 RX ORDER — HYDROMORPHONE HYDROCHLORIDE 1 MG/ML
0.5 INJECTION, SOLUTION INTRAMUSCULAR; INTRAVENOUS; SUBCUTANEOUS
Status: DISCONTINUED | OUTPATIENT
Start: 2021-08-28 | End: 2021-08-28 | Stop reason: HOSPADM

## 2021-08-28 RX ORDER — IOPAMIDOL 755 MG/ML
500 INJECTION, SOLUTION INTRAVASCULAR ONCE
Status: COMPLETED | OUTPATIENT
Start: 2021-08-28 | End: 2021-08-28

## 2021-08-28 RX ADMIN — SODIUM CHLORIDE 1000 ML: 9 INJECTION, SOLUTION INTRAVENOUS at 10:14

## 2021-08-28 RX ADMIN — IOPAMIDOL 100 ML: 755 INJECTION, SOLUTION INTRAVENOUS at 10:40

## 2021-08-28 RX ADMIN — SODIUM CHLORIDE 65 ML: 9 INJECTION, SOLUTION INTRAVENOUS at 10:41

## 2021-08-28 ASSESSMENT — ENCOUNTER SYMPTOMS
FEVER: 0
ABDOMINAL PAIN: 1

## 2021-08-28 NOTE — ED PROVIDER NOTES
History   Chief Complaint:  Abdominal Pain      HPI  Rigoberto Dowd is a 40 year old male with history of obesity, shingles, and cellulitis who presents with abdominal pain. Rigoberto has had scarring or bruises on his left side since returning from vacation a couple months ago, and was diagnosed with shingles there. He took a medication for that, but still had some pain in his side and under his ribs. This has grown more severe in the past week or two, so he presents to the ED. He describes his pain as pressure and tenderness, and notes that rotating onto his side while trying to sleep does not palliate his symptoms. He reports that he had some nausea and diarrhea with the initial diagnosis of shingles months ago but this has resolved, and he denies fever or new rash at this time.    Review of Systems   Constitutional: Negative for fever.   Gastrointestinal: Positive for abdominal pain.   All other systems reviewed and are negative.    Allergies:  Morphine    Medications:  Amlodipine  Mirapex  Stelara  Meclizine  Gabapentin  Cosentyx  Allopurinol  Naproxen  Chlorthalidone  Zofran    Past Medical History:  Arthritis  Psoriasis  Facial cellulitis  Otalgia  Mild depression  Combined pyramidal-extrapyramidal syndrome  Adiposity  Benign neoplasm of colon   Restless leg syndrome  Shingles  Obesity  Chondromalacia of left patella  Pulmonary nodule  hemorrhoids    Past Surgical History:    Eye surgery  GI surgery  Orthopedic surgery   Tonsillectomy  Carpal tunnel release, bilateral  Vasectomy    Family History:    Cataract x2  Hypertension  Retinal detachment  Arthritis  Sleep apnea x2    Social History:  Presents alone.  Went on vacation a few months ago.  PCP (auto-populated): [facility]      Physical Exam     Patient Vitals for the past 24 hrs:   BP Temp Temp src Pulse Resp SpO2   08/28/21 1015 (!) 136/95 -- -- -- -- 97 %   08/28/21 0629 (!) 170/106 97.9  F (36.6  C) Oral 89 20 99 %       Physical  Exam  Constitutional:       Appearance: He is well-developed.   HENT:      Right Ear: External ear normal.      Left Ear: External ear normal.      Mouth/Throat:      Mouth: Mucous membranes are moist.      Pharynx: Oropharynx is clear. No oropharyngeal exudate.   Eyes:      General: No scleral icterus.     Conjunctiva/sclera: Conjunctivae normal.      Pupils: Pupils are equal, round, and reactive to light.   Cardiovascular:      Rate and Rhythm: Normal rate and regular rhythm.      Heart sounds: Normal heart sounds. No murmur heard.   No friction rub. No gallop.    Pulmonary:      Effort: Pulmonary effort is normal. No respiratory distress.      Breath sounds: Normal breath sounds. No wheezing or rales.   Abdominal:      General: Bowel sounds are normal. There is no distension.      Palpations: Abdomen is soft. There is no mass.      Tenderness: There is abdominal tenderness. There is no right CVA tenderness or left CVA tenderness.      Comments: Mild BLQ TTP   Musculoskeletal:         General: Normal range of motion.   Skin:     General: Skin is warm and dry.      Capillary Refill: Capillary refill takes less than 2 seconds.      Findings: No rash.   Neurological:      Mental Status: He is alert.       Emergency Department Course     Imaging:  CT Abdomen Pelvis w Contrast  No acute process demonstrated in the abdomen and pelvis.    NEENA ZAVALA MD   Reading per radiology.    Laboratory:  CBC: WBC: 6.4, HGB: 15.4, PLT: 244  CMP: Glucose 101 (H), o/w WNL (Creatinine: 0.86)  UA: Blood: small (A), Mucous: Present, o/w Negative    Emergency Department Course:    Reviewed:  I reviewed nursing notes, vitals, past medical history and care everywhere    Assessments:  0950 I obtained history and examined the patient as noted above.  1116 I rechecked the patient and explained findings.    Interventions:  1014 NS 1000 mL IV    Disposition:  The patient was discharged to home.       Impression & Plan     Medical Decision  Making:  Patient presents with left-sided abdominal pain where he had previous shingles. Now the pain is spreading towards the lower abdomen and diffusely up. He describes it as a kind of vague pain, although it is there consistently, with no new rashes. Nothing that I can see on exam. Evaluation here is unremarkable. I suspect that he may have postherpetic neuralgia related ot his recent episode. We discussed using nerve medications such Lyrica to treat this, but given that he is on a whole list of other chronic medication, I felt that this discussion would be better had with his regular doctor. He is comfortable with that. He declined any other pain medication to go home with as we did discuss that narcotics would really not be the best option and would not help with the pain too much. He agrees with the follow-up plan, and return precautions were provided.    Diagnosis:    ICD-10-CM    1. Abdominal pain, left lower quadrant  R10.32    2. Post herpetic neuralgia  B02.29        Scribe Disclosure:  I, Deepak Villalpando, am serving as a scribe at 9:51 AM on 8/28/2021 to document services personally performed by Daisy Dwyer MD based on my observations and the provider's statements to me.      Daisy Dwyer MD  08/28/21 [id number]

## 2021-08-28 NOTE — DISCHARGE INSTRUCTIONS
Please follow up with your doctor for further evaluation. You may have nerve pain related to shingles  You can take motrin and tylenol as needed for now

## 2021-08-28 NOTE — ED TRIAGE NOTES
Pt states left sided abdominal pain for over 3 months pta that is worse tonight. Pt states has seen UC, PCP and GI for pain without improvement. ABCs intact GCS 15

## 2021-09-05 ENCOUNTER — HEALTH MAINTENANCE LETTER (OUTPATIENT)
Age: 41
End: 2021-09-05

## 2022-01-24 ENCOUNTER — HOSPITAL ENCOUNTER (OUTPATIENT)
Dept: NUCLEAR MEDICINE | Facility: CLINIC | Age: 42
Setting detail: NUCLEAR MEDICINE
Discharge: HOME | End: 2022-01-24
Admitting: INTERNAL MEDICINE
Payer: COMMERCIAL

## 2022-01-24 DIAGNOSIS — R10.84 GENERALIZED ABDOMINAL DISCOMFORT: ICD-10-CM

## 2022-01-24 PROCEDURE — A9537 TC99M MEBROFENIN: HCPCS | Performed by: INTERNAL MEDICINE

## 2022-01-24 PROCEDURE — 78227 HEPATOBIL SYST IMAGE W/DRUG: CPT

## 2022-01-24 PROCEDURE — 343N000001 HC RX 343: Performed by: INTERNAL MEDICINE

## 2022-01-24 RX ORDER — KIT FOR THE PREPARATION OF TECHNETIUM TC 99M MEBROFENIN 45 MG/10ML
5 INJECTION, POWDER, LYOPHILIZED, FOR SOLUTION INTRAVENOUS ONCE
Status: COMPLETED | OUTPATIENT
Start: 2022-01-24 | End: 2022-01-24

## 2022-01-24 RX ADMIN — MEBROFENIN 6 MCI.: 45 INJECTION, POWDER, LYOPHILIZED, FOR SOLUTION INTRAVENOUS at 08:10

## 2022-02-20 ENCOUNTER — HEALTH MAINTENANCE LETTER (OUTPATIENT)
Age: 42
End: 2022-02-20

## 2022-03-28 ENCOUNTER — OFFICE VISIT (OUTPATIENT)
Dept: URGENT CARE | Facility: URGENT CARE | Age: 42
End: 2022-03-28
Payer: COMMERCIAL

## 2022-03-28 ENCOUNTER — ANCILLARY PROCEDURE (OUTPATIENT)
Dept: GENERAL RADIOLOGY | Facility: CLINIC | Age: 42
End: 2022-03-28
Attending: FAMILY MEDICINE
Payer: COMMERCIAL

## 2022-03-28 VITALS
RESPIRATION RATE: 16 BRPM | OXYGEN SATURATION: 98 % | BODY MASS INDEX: 38.65 KG/M2 | DIASTOLIC BLOOD PRESSURE: 84 MMHG | HEART RATE: 88 BPM | SYSTOLIC BLOOD PRESSURE: 132 MMHG | TEMPERATURE: 98.9 F | WEIGHT: 285 LBS

## 2022-03-28 DIAGNOSIS — J10.1 INFLUENZA A: ICD-10-CM

## 2022-03-28 DIAGNOSIS — G47.33 OBSTRUCTIVE SLEEP APNEA SYNDROME: ICD-10-CM

## 2022-03-28 DIAGNOSIS — R09.81 CONGESTION OF PARANASAL SINUS: ICD-10-CM

## 2022-03-28 DIAGNOSIS — R52 BODY ACHES: Primary | ICD-10-CM

## 2022-03-28 DIAGNOSIS — R06.02 SOB (SHORTNESS OF BREATH): ICD-10-CM

## 2022-03-28 DIAGNOSIS — R05.8 PRODUCTIVE COUGH: ICD-10-CM

## 2022-03-28 LAB
FLUAV AG SPEC QL IA: POSITIVE
FLUBV AG SPEC QL IA: NEGATIVE

## 2022-03-28 PROCEDURE — 71046 X-RAY EXAM CHEST 2 VIEWS: CPT | Performed by: RADIOLOGY

## 2022-03-28 PROCEDURE — 87804 INFLUENZA ASSAY W/OPTIC: CPT | Performed by: FAMILY MEDICINE

## 2022-03-28 PROCEDURE — 99203 OFFICE O/P NEW LOW 30 MIN: CPT | Performed by: FAMILY MEDICINE

## 2022-03-28 RX ORDER — PRAMIPEXOLE DIHYDROCHLORIDE 0.5 MG/1
0.5 TABLET ORAL
COMMUNITY
Start: 2015-04-23

## 2022-03-28 RX ORDER — CHLORTHALIDONE 25 MG/1
1 TABLET ORAL DAILY
COMMUNITY
Start: 2021-07-16

## 2022-03-28 RX ORDER — SECUKINUMAB 150 MG/ML
INJECTION SUBCUTANEOUS
COMMUNITY
Start: 2022-03-21

## 2022-03-28 RX ORDER — BENZONATATE 100 MG/1
100 CAPSULE ORAL 3 TIMES DAILY PRN
Qty: 30 CAPSULE | Refills: 0 | Status: SHIPPED | OUTPATIENT
Start: 2022-03-28

## 2022-03-28 RX ORDER — GABAPENTIN 100 MG/1
CAPSULE ORAL
COMMUNITY
Start: 2021-08-06

## 2022-03-28 RX ORDER — PREDNISONE 20 MG/1
TABLET ORAL
COMMUNITY
Start: 2020-06-30

## 2022-08-19 NOTE — PROGRESS NOTES
Chief Complaint   Patient presents with     Sick     congestion, SOB, sinus pressure, drainage down the throat, chills, bodyaches x 2-3 days  - neg HOME COVID TEST ON SATURDAY     Initial differential diagnosis included but was not restricted to   Differential Diagnosis:  URI Adult/Peds:  Bronchitis-viral, Influenza, Pneumonia, Sinusitis, Strep pharyngitis, Tonsilitis, Viral pharyngitis, Viral syndrome and Viral upper respiratory illness  Medical Decision Making:    ASSESMENT AND PLAN   Rigoberto was seen today for sick.    Diagnoses and all orders for this visit:    Body aches    SOB (shortness of breath)    Congestion of paranasal sinus    Productive cough  -     XR Chest 2 Views  -     benzonatate (TESSALON) 100 MG capsule; Take 1 capsule (100 mg) by mouth 3 times daily as needed    Obstructive sleep apnea syndrome    Influenza A    Other orders  -     Influenza A/B antigen     reviewed with patient flu test came positive for influenza A as he is otherwise healthy I do not think he needs Tamiflu  Continue on cough medications   Tylenol, Fluids, Rest, OTC decongestant/antihistamine, Saline gargles and Vaporizer  Routine discharge counseling was given to the patient and the patient understands that worsening, changing or persistent symptoms should prompt an immediate call or follow up with their primary physician or the emergency department. The importance of appropriate follow up was also discussed with the patient.     I have reviewed the nursing notes.  Review of the result(s) of each unique test -   X-Ray was done, my findings are: no acute infiltrate noted     Time  spent on the date of the encounter doing chart review, review of test results, interpretation of tests, patient visit and documentation   see orders in Epic  Pt verbalized and agreed with the plan and is aware of the worsening symptoms for which would need to follow up .  Pt was stable during time of discharge from the clinic     SUBJECTIVE     Rigoberto  Did not schedule stress test ordered at last visit, will reorder.  Nonspecific, nonexertional pattern.  Had a previous echocardiogram stress test in 2018, normal   MARILU Dowd is a 41 year old male presenting with a chief complaint of    Chief Complaint   Patient presents with     Sick     congestion, SOB, sinus pressure, drainage down the throat, chills, bodyaches x 2-3 days  - neg HOME COVID TEST ON SATURDAY           Rigoberto Dowd is a 41 year old male presenting with a chief complaint of runny nose, stuffy nose, cough - productive, shortness of breath and headache. He is an established patient of [facility].  Onset of symptoms was 2 day(s) ago.  Course of illness is worsening.    Severity moderate  Current and Associated symptoms: cough - productive and shortness of breath  Treatment measures tried include None tried.  Predisposing factors include ill contact: Family member .    Past Medical History:   Diagnosis Date     Arthritis      H/O colonoscopy      H/O hemorrhoids      Psoriasis      Sleep apnea      Current Outpatient Medications   Medication Sig Dispense Refill     benzonatate (TESSALON) 100 MG capsule Take 1 capsule (100 mg) by mouth 3 times daily as needed 30 capsule 0     chlorthalidone (HYGROTON) 25 MG tablet Take 1 tablet by mouth daily       COSENTYX SENSOREADY, 300 MG, 150 MG/ML SOAJ        gabapentin (NEURONTIN) 100 MG capsule TAKE FIVE TABLETS BY MOUTH AT BEDTIME       pramipexole (MIRAPEX) 0.5 MG tablet Take 0.5 mg by mouth       predniSONE (DELTASONE) 20 MG tablet USE FOR GOUT FLARE (EMERGENCY MED) TAKE 2 TABLETS BY MOUTH DAILY FOR 5 DAYS , THEN TAKE 1 TABLET DAILY FOR 5 DAYS , THEN STOP       AMLODIPINE BESYLATE PO Take 5 mg by mouth every evening (Patient not taking: Reported on 3/28/2022)       hydrocortisone 2.5 % ointment Apply topically 3 times daily as needed (Patient not taking: Reported on 3/28/2022)       Ibuprofen (ADVIL PO) Take 600 mg by mouth every 8 hours as needed for moderate pain (Patient not taking: Reported on 3/28/2022)       Naproxen Sodium (ALEVE PO) Take 220 mg by mouth 2 times daily as needed for moderate pain (Patient not taking:  Reported on 3/28/2022)       Pramipexole Dihydrochloride (MIRAPEX PO) Take 1 mg by mouth At Bedtime  (Patient not taking: Reported on 3/28/2022)       ustekinumab (STELARA) 45 MG/0.5ML SOSY Inject 45 mg Subcutaneous every 3 months  (Patient not taking: Reported on 3/28/2022)       Social History     Tobacco Use     Smoking status: Former Smoker     Smokeless tobacco: Never Used   Substance Use Topics     Alcohol use: Yes     Comment: occ     History reviewed. No pertinent family history.      ROS:    10 point ROS of systems including Constitutional, Eyes,  Cardiovascular, Gastroenterology, Genitourinary, Integumentary, Muscularskeletal, Psychiatric ,neurological were all negative except for pertinent positives noted in my HPI         OBJECTIVE:    /84 (BP Location: Right arm, Patient Position: Chair, Cuff Size: Adult Large)   Pulse 88   Temp 98.9  F (37.2  C) (Oral)   Resp 16   Wt 129.3 kg (285 lb)   SpO2 98%   BMI 38.65 kg/m    GENERAL APPEARANCE: healthy, alert and no distress  EYES: EOMI,  PERRL, conjunctiva clear  HENT: ear canals and TM's normal.  Nose and mouth without ulcers, erythema or lesions  NECK: supple, nontender, no lymphadenopathy  RESP: lungs clear to auscultation - no rales, rhonchi or wheezes  CV: regular rates and rhythm, normal S1 S2, no murmur noted  PSYCH: mentation appears normal  Physical Exam      (Note was completed, in part, with [employer] voice-recognition software. Documentation reviewed, but some grammatical, spelling, and word errors may remain.)  Yolanda Allen MD.

## 2022-10-23 ENCOUNTER — HEALTH MAINTENANCE LETTER (OUTPATIENT)
Age: 42
End: 2022-10-23

## 2023-04-02 ENCOUNTER — HEALTH MAINTENANCE LETTER (OUTPATIENT)
Age: 43
End: 2023-04-02

## 2024-06-02 ENCOUNTER — HEALTH MAINTENANCE LETTER (OUTPATIENT)
Age: 44
End: 2024-06-02